# Patient Record
Sex: MALE | Race: BLACK OR AFRICAN AMERICAN | NOT HISPANIC OR LATINO | Employment: FULL TIME | URBAN - METROPOLITAN AREA
[De-identification: names, ages, dates, MRNs, and addresses within clinical notes are randomized per-mention and may not be internally consistent; named-entity substitution may affect disease eponyms.]

---

## 2019-06-25 LAB — HBA1C MFR BLD HPLC: 8.3 %

## 2019-09-17 ENCOUNTER — TRANSCRIBE ORDERS (OUTPATIENT)
Dept: NEPHROLOGY | Facility: CLINIC | Age: 59
End: 2019-09-17

## 2019-09-17 DIAGNOSIS — N18.30 BENIGN HYPERTENSION WITH CKD (CHRONIC KIDNEY DISEASE) STAGE III (HCC): Primary | ICD-10-CM

## 2019-09-17 DIAGNOSIS — I12.9 BENIGN HYPERTENSION WITH CKD (CHRONIC KIDNEY DISEASE) STAGE III (HCC): Primary | ICD-10-CM

## 2019-09-18 ENCOUNTER — CONSULT (OUTPATIENT)
Dept: NEPHROLOGY | Facility: CLINIC | Age: 59
End: 2019-09-18
Payer: COMMERCIAL

## 2019-09-18 ENCOUNTER — APPOINTMENT (OUTPATIENT)
Dept: LAB | Facility: HOSPITAL | Age: 59
End: 2019-09-18
Attending: INTERNAL MEDICINE
Payer: COMMERCIAL

## 2019-09-18 VITALS
SYSTOLIC BLOOD PRESSURE: 115 MMHG | HEART RATE: 55 BPM | RESPIRATION RATE: 16 BRPM | WEIGHT: 221.8 LBS | TEMPERATURE: 98 F | HEIGHT: 72 IN | BODY MASS INDEX: 30.04 KG/M2 | DIASTOLIC BLOOD PRESSURE: 80 MMHG

## 2019-09-18 DIAGNOSIS — E55.9 VITAMIN D DEFICIENCY: ICD-10-CM

## 2019-09-18 DIAGNOSIS — N18.3 TYPE 2 DIABETES MELLITUS WITH STAGE 3 CHRONIC KIDNEY DISEASE, UNSPECIFIED WHETHER LONG TERM INSULIN USE: ICD-10-CM

## 2019-09-18 DIAGNOSIS — N18.30 STAGE 3 CHRONIC KIDNEY DISEASE (HCC): Primary | ICD-10-CM

## 2019-09-18 DIAGNOSIS — N18.30 STAGE 3 CHRONIC KIDNEY DISEASE (HCC): ICD-10-CM

## 2019-09-18 DIAGNOSIS — E11.22 TYPE 2 DIABETES MELLITUS WITH STAGE 3 CHRONIC KIDNEY DISEASE, UNSPECIFIED WHETHER LONG TERM INSULIN USE: ICD-10-CM

## 2019-09-18 DIAGNOSIS — I12.9 HYPERTENSIVE KIDNEY DISEASE WITH STAGE 3 CHRONIC KIDNEY DISEASE (HCC): ICD-10-CM

## 2019-09-18 DIAGNOSIS — N18.30 HYPERTENSIVE KIDNEY DISEASE WITH STAGE 3 CHRONIC KIDNEY DISEASE (HCC): ICD-10-CM

## 2019-09-18 LAB
25(OH)D3 SERPL-MCNC: 15 NG/ML (ref 30–100)
ANION GAP SERPL CALCULATED.3IONS-SCNC: 9 MMOL/L (ref 4–13)
BACTERIA UR QL AUTO: ABNORMAL /HPF
BASOPHILS # BLD AUTO: 0.02 THOUSANDS/ΜL (ref 0–0.1)
BASOPHILS NFR BLD AUTO: 0 % (ref 0–1)
BILIRUB UR QL STRIP: NEGATIVE
BUN SERPL-MCNC: 39 MG/DL (ref 5–25)
CALCIUM SERPL-MCNC: 9.5 MG/DL (ref 8.3–10.1)
CHLORIDE SERPL-SCNC: 102 MMOL/L (ref 100–108)
CLARITY UR: CLEAR
CO2 SERPL-SCNC: 26 MMOL/L (ref 21–32)
COLOR UR: ABNORMAL
CREAT SERPL-MCNC: 1.9 MG/DL (ref 0.6–1.3)
CREAT UR-MCNC: 82.8 MG/DL
EOSINOPHIL # BLD AUTO: 0.01 THOUSAND/ΜL (ref 0–0.61)
EOSINOPHIL NFR BLD AUTO: 0 % (ref 0–6)
ERYTHROCYTE [DISTWIDTH] IN BLOOD BY AUTOMATED COUNT: 13.4 % (ref 11.6–15.1)
GFR SERPL CREATININE-BSD FRML MDRD: 44 ML/MIN/1.73SQ M
GLUCOSE SERPL-MCNC: 110 MG/DL (ref 65–140)
GLUCOSE UR STRIP-MCNC: NEGATIVE MG/DL
HCT VFR BLD AUTO: 46 % (ref 36.5–49.3)
HGB BLD-MCNC: 14.7 G/DL (ref 12–17)
HGB UR QL STRIP.AUTO: NEGATIVE
IMM GRANULOCYTES # BLD AUTO: 0.02 THOUSAND/UL (ref 0–0.2)
IMM GRANULOCYTES NFR BLD AUTO: 0 % (ref 0–2)
KETONES UR STRIP-MCNC: NEGATIVE MG/DL
LEUKOCYTE ESTERASE UR QL STRIP: NEGATIVE
LYMPHOCYTES # BLD AUTO: 1.76 THOUSANDS/ΜL (ref 0.6–4.47)
LYMPHOCYTES NFR BLD AUTO: 35 % (ref 14–44)
MCH RBC QN AUTO: 27.8 PG (ref 26.8–34.3)
MCHC RBC AUTO-ENTMCNC: 32 G/DL (ref 31.4–37.4)
MCV RBC AUTO: 87 FL (ref 82–98)
MICROALBUMIN UR-MCNC: 6.5 MG/L (ref 0–20)
MICROALBUMIN/CREAT 24H UR: 8 MG/G CREATININE (ref 0–30)
MONOCYTES # BLD AUTO: 0.56 THOUSAND/ΜL (ref 0.17–1.22)
MONOCYTES NFR BLD AUTO: 11 % (ref 4–12)
NEUTROPHILS # BLD AUTO: 2.66 THOUSANDS/ΜL (ref 1.85–7.62)
NEUTS SEG NFR BLD AUTO: 54 % (ref 43–75)
NITRITE UR QL STRIP: NEGATIVE
NON-SQ EPI CELLS URNS QL MICRO: ABNORMAL /HPF
NRBC BLD AUTO-RTO: 0 /100 WBCS
OTHER STN SPEC: ABNORMAL
PH UR STRIP.AUTO: 5 [PH]
PHOSPHATE SERPL-MCNC: 2.7 MG/DL (ref 2.7–4.5)
PLATELET # BLD AUTO: 180 THOUSANDS/UL (ref 149–390)
PMV BLD AUTO: 10.5 FL (ref 8.9–12.7)
POTASSIUM SERPL-SCNC: 4.3 MMOL/L (ref 3.5–5.3)
PROT UR STRIP-MCNC: NEGATIVE MG/DL
PTH-INTACT SERPL-MCNC: 76.5 PG/ML (ref 18.4–80.1)
RBC # BLD AUTO: 5.29 MILLION/UL (ref 3.88–5.62)
RBC #/AREA URNS AUTO: ABNORMAL /HPF
SODIUM SERPL-SCNC: 137 MMOL/L (ref 136–145)
SP GR UR STRIP.AUTO: 1.01 (ref 1–1.03)
URATE SERPL-MCNC: 5.9 MG/DL (ref 4.2–8)
UROBILINOGEN UR QL STRIP.AUTO: 0.2 E.U./DL
WBC # BLD AUTO: 5.03 THOUSAND/UL (ref 4.31–10.16)
WBC #/AREA URNS AUTO: ABNORMAL /HPF

## 2019-09-18 PROCEDURE — 82043 UR ALBUMIN QUANTITATIVE: CPT

## 2019-09-18 PROCEDURE — 82570 ASSAY OF URINE CREATININE: CPT

## 2019-09-18 PROCEDURE — 84550 ASSAY OF BLOOD/URIC ACID: CPT

## 2019-09-18 PROCEDURE — 99244 OFF/OP CNSLTJ NEW/EST MOD 40: CPT | Performed by: INTERNAL MEDICINE

## 2019-09-18 PROCEDURE — 83970 ASSAY OF PARATHORMONE: CPT

## 2019-09-18 PROCEDURE — 36415 COLL VENOUS BLD VENIPUNCTURE: CPT

## 2019-09-18 PROCEDURE — 84100 ASSAY OF PHOSPHORUS: CPT

## 2019-09-18 PROCEDURE — 80048 BASIC METABOLIC PNL TOTAL CA: CPT

## 2019-09-18 PROCEDURE — 81001 URINALYSIS AUTO W/SCOPE: CPT

## 2019-09-18 PROCEDURE — 82306 VITAMIN D 25 HYDROXY: CPT

## 2019-09-18 PROCEDURE — 85025 COMPLETE CBC W/AUTO DIFF WBC: CPT

## 2019-09-18 RX ORDER — SILDENAFIL 100 MG/1
TABLET, FILM COATED ORAL
COMMUNITY
Start: 2018-08-08

## 2019-09-18 RX ORDER — CHOLECALCIFEROL (VITAMIN D3) 50 MCG
TABLET ORAL
COMMUNITY
End: 2020-03-03 | Stop reason: ALTCHOICE

## 2019-09-18 RX ORDER — FLUTICASONE PROPIONATE 50 MCG
SPRAY, SUSPENSION (ML) NASAL
Refills: 11 | COMMUNITY
Start: 2019-07-09

## 2019-09-18 RX ORDER — PEN NEEDLE, DIABETIC 32GX 5/32"
NEEDLE, DISPOSABLE MISCELLANEOUS
Refills: 9 | COMMUNITY
Start: 2019-08-24

## 2019-09-18 RX ORDER — CARVEDILOL 6.25 MG/1
6.25 TABLET ORAL 2 TIMES DAILY WITH MEALS
Refills: 0 | COMMUNITY
Start: 2019-08-08

## 2019-09-18 RX ORDER — ATORVASTATIN CALCIUM 40 MG/1
TABLET, FILM COATED ORAL
Refills: 0 | COMMUNITY
Start: 2019-09-04

## 2019-09-18 RX ORDER — CHLORTHALIDONE 25 MG/1
TABLET ORAL
COMMUNITY
Start: 2019-09-11

## 2019-09-18 RX ORDER — LOSARTAN POTASSIUM 100 MG/1
TABLET ORAL
Refills: 0 | COMMUNITY
Start: 2019-06-25

## 2019-09-18 RX ORDER — SPIRONOLACTONE 50 MG/1
TABLET, FILM COATED ORAL
COMMUNITY
Start: 2019-07-23 | End: 2020-03-03 | Stop reason: ALTCHOICE

## 2019-09-18 RX ORDER — GLIMEPIRIDE 4 MG/1
4 TABLET ORAL
COMMUNITY
Start: 2019-06-11 | End: 2020-03-03 | Stop reason: ALTCHOICE

## 2019-09-18 RX ORDER — INSULIN GLARGINE 100 [IU]/ML
INJECTION, SOLUTION SUBCUTANEOUS
Refills: 11 | COMMUNITY
Start: 2019-08-23

## 2019-09-18 RX ORDER — SITAGLIPTIN 100 MG/1
TABLET, FILM COATED ORAL
Refills: 0 | COMMUNITY
Start: 2019-08-30 | End: 2020-03-03 | Stop reason: ALTCHOICE

## 2019-09-18 NOTE — LETTER
September 18, 2019     Alana Pa MD  [facility] [address] [facility]  [address]    Patient: Saulo Gallegos   YOB: 1960   Date of Visit: 9/18/2019       Dear Dr Lew Lord: Thank you for referring Saulo Gallegos to me for evaluation  Below are my notes for this consultation  If you have questions, please do not hesitate to call me  I look forward to following your patient along with you  Sincerely,        Ashlee Talavera MD        CC: No Recipients  Ashlee Talavera MD  9/18/2019 12:02 PM  Sign at close encounter  [address] 60 y o  @SEX @ YOB: 1960 MRN: [id number]    Encounter: [id number] DATE: 9/18/2019    REASON FOR VISIT: Saulo Gallegos is a 60 y o male who was referred by Janis Levin for further management of chronic kidney disease  HPI:    This is 77-year-old male with a past medical history of diabetes type 2, hypertension, thoracic aneurysm, hyperlipidemia, elevated PSA level was referred to Nephrology for further management of CKD stage 3  Patient was recently being seen by nephrologist at [facility] for underlying chronic kidney disease and decided to change physician  I have reviewed patient's old medical records including records from [facility]  Patient seems to have underlying CKD stage 3 and baseline creatinine level seems to be fluctuating between 1 7-2 1  Last known creatinine is 1 7 with EGFR of 43 which was done on 6/25/2019  I have also reviewed patient's renal ultrasound which was done in 7/2018 which showed increased echogenicity with echoic area and posterior wall of the bladder  Patient has underlying hypertension which seems under well control  Currently patient is taking spironolactone, chlorthalidone, Coreg and losartan  Patient also have underlying diabetes type 2 with last known Hb A1c of 8 3%  Recently metformin has been stopped by PCP    Patient is taking Januvia, glimepiride along with insulin now  Patient also have underlying hyperlipidemia which seems under well control with the use of atorvastatin  Patient takes all the medications as prescribed and denies use of NSAIDs  REVIEW OF SYSTEMS:    Review of Systems   Constitutional: Negative for chills and fever  HENT: Negative for nosebleeds and sore throat  Eyes: Negative for photophobia and pain  Respiratory: Negative for choking and wheezing  Cardiovascular: Negative for chest pain and palpitations  Gastrointestinal: Negative for abdominal pain and blood in stool  Endocrine: Negative for cold intolerance and heat intolerance  Genitourinary: Negative for flank pain and hematuria  Musculoskeletal: Negative for joint swelling and neck pain  Skin: Negative for color change and pallor  Allergic/Immunologic: Negative for environmental allergies and immunocompromised state  Neurological: Negative for seizures and syncope  Hematological: Negative for adenopathy  Does not bruise/bleed easily  Psychiatric/Behavioral: Negative for confusion and suicidal ideas           PAST MEDICAL HISTORY:  Past Medical History:   Diagnosis Date    Diabetes ([facility] [address] )     Kidney disease        PAST SURGICAL HISTORY:  Past Surgical History:   Procedure Laterality Date    HERNIA REPAIR         SOCIAL HISTORY:  Social History     Substance and Sexual Activity   Alcohol Use Not on file     Social History     Substance and Sexual Activity   Drug Use Not on file     Social History     Tobacco Use   Smoking Status Not on file       FAMILY HISTORY:  Family History   Problem Relation Age of Onset    Cancer Mother     Diabetes Mother     Diabetes Father     Hypertension Father        ALLERGY:  Allergies   Allergen Reactions    Tuberculin Purified Protein Derivative Other (See Comments)     Hx + ppd       MEDICATIONS:    Current Outpatient Medications:     aspirin 81 MG tablet, Take 81 mg by mouth, Disp: , Rfl:     carvedilol (COREG) 6 25 mg tablet, Take 6 25 mg by mouth 2 (two) times a day with meals, Disp: , Rfl: 0    chlorthalidone 25 mg tablet, take 1 tablet by mouth once daily, Disp: , Rfl:     glimepiride (AMARYL) 4 mg tablet, Take 4 mg by mouth, Disp: , Rfl:     glucose blood test strip, TEST twice a day TO 3 TIMES A DAY, Disp: , Rfl:     insulin glargine (LANTUS SOLOSTAR) 100 units/mL injection pen, Inject 10 Units under the skin, Disp: , Rfl:     losartan (COZAAR) 100 MG tablet, , Disp: , Rfl: 0    sildenafil (VIAGRA) 100 mg tablet, take 1 tablet by mouth 1 TO 4 HOURS BEFORE INTERCOURSE, NO MORE THAN 1 DOSE IN 24 HOURS, Disp: , Rfl:     spironolactone (ALDACTONE) 50 mg tablet, TAKE 1 TABLET BY MOUTH DAILY, Disp: , Rfl:     atorvastatin (LIPITOR) 40 mg tablet, , Disp: , Rfl: 0    BASAGLAR KWIKPEN 100 units/mL injection pen, PLEASE SEE ATTACHED FOR DETAILED DIRECTIONS, Disp: , Rfl: 11    BD PEN NEEDLE DESMOND U/F 32G X 4 MM MISC, Use as directed, Disp: , Rfl: 9    Cholecalciferol (VITAMIN D) 2000 units tablet, Take by mouth, Disp: , Rfl:     fluticasone (FLONASE) 50 mcg/act nasal spray, SPRAY 2 SPRAYS INTO EACH NOSTRIL EVERY DAY, Disp: , Rfl: 11    hydrocortisone (WESTCORT) 0 2 % cream, APPLY TOPICALLY TO AFFECTED AREA 2 TIMES A DAY, Disp: , Rfl: 0    JANUVIA 100 MG tablet, , Disp: , Rfl: 0    Multiple Vitamins-Minerals (MULTIVITAL) tablet, Take by mouth, Disp: , Rfl:     ONE TOUCH ULTRA TEST test strip, TEST TWO TO THREE TIMES A DAY, Disp: , Rfl: 0    PHYSICAL EXAM:  Vitals:    09/18/19 1130   BP: 115/80   BP Location: Right arm   Patient Position: Sitting   Cuff Size: Large   Pulse: 55   Resp: 16   Temp: 98 °F (36 7 °C)   TempSrc: Oral   Weight: 101 kg (221 lb 12 8 oz)   Height: 5' 11 5" (1 816 m)     Body mass index is 30 5 kg/m²  Physical Exam   Constitutional: He appears well-nourished  No distress  HENT:   Head: Normocephalic and atraumatic  Eyes: No scleral icterus  Neck: Neck supple  No JVD present  Cardiovascular: Normal rate, S1 normal and S2 normal    Pulmonary/Chest: Effort normal  No accessory muscle usage  No respiratory distress  He has no wheezes  Abdominal: Soft  He exhibits no distension  Musculoskeletal: He exhibits no edema  Right ankle: He exhibits no swelling  Left ankle: He exhibits no swelling  Right hand: He exhibits no laceration  Left hand: He exhibits no laceration  Lymphadenopathy:        Right cervical: No superficial cervical adenopathy present  Left cervical: No superficial cervical adenopathy present  Neurological: He is alert  He is not disoriented  Skin: Skin is warm  No cyanosis  Psychiatric: He is not combative  He does not exhibit a depressed mood  He expresses no suicidal ideation  LAB RESULTS:  No results found for this or any previous visit  Renal ultrasound done on 7/2018 showed increased echogenicity  Patient was also found to have aechoic area in posterior bladder wall  ASSESSMENT and PLAN:  Suresh Nesbitt was seen today for consult, hypertension and chronic kidney disease  Diagnoses and all orders for this visit:    Stage 3 chronic kidney disease ([facility] )  -     CBC and differential; Future  -     Basic metabolic panel; Future  -     Urinalysis with microscopic; Future  -     Microalbumin / creatinine urine ratio; Future  -     Phosphorus; Future  -     Uric acid; Future  -     PTH, intact; Future  -     Vitamin D 25 hydroxy; Future  -     US kidney and bladder with pvr; Future  -     CBC and differential; Future  -     Basic metabolic panel; Future  -     Urinalysis with reflex to microscopic; Future  -     Microalbumin / creatinine urine ratio; Future    Hypertensive kidney disease with stage 3 chronic kidney disease ([facility] )  -     Ambulatory referral to Nephrology  -     Basic metabolic panel;  Future    Type 2 diabetes mellitus with stage 3 chronic kidney disease, unspecified whether long term insulin use ([facility] )  - Basic metabolic panel; Future  -     Urinalysis with microscopic; Future  -     Microalbumin / creatinine urine ratio; Future      1  CKD stage 3  Multifactorial and suspected due to underlying diabetic nephropathy on top of hypertensive nephrosclerosis  Upon review of old medical records including records from [facility], patient's baseline creatinine seems to be fluctuating between 1 7-2 1 with last known creatinine of 1 7 with EGFR of 43 which was done on 6/25/2019  Patient was also previously been followed by nephrologist at [facility]  Plan to check CBC, BMP, uric acid, PTH, vitamin-D, urine analysis along with urine microalbumin to creatinine ratio for further evaluation  Patient's last known renal ultrasound which was done on 7/25/2018 showed increased echogenicity with focal a quick area in posterior right bladder wall  Will plan to recheck renal ultrasound today  Will consider referring patient to urologist for possible cystoscopy if found to have persistent echoic area in posterior bladder wall  Management of diabetes and hypertension as mentioned below  Plan to avoid NSAIDs and recheck renal function before next visit  2  Hypertension in chronic kidney disease  Today's blood pressure was under well control and plan to monitor hypertension with spironolactone 50 mg PO daily, Coreg 6 25 mg PO BID losartan 100 mg PO daily and chlorthalidone 25 mg PO daily  Advised patient to follow low-salt diet  3  Diabetes type 2 in chronic kidney disease  Recent Hb A1c was 8 3% for PCPas note  Goal Hb A1c would be < 7% in the light of underlying chronic kidney disease  Metformin has been recently stopped by PCP  Defer further management of diabetes to PCP  Continue to avoid metformin for time being  Returns to Nephrology office in 3 months  Plan to check CBC, BMP, urine analysis along with urine microalbumin to creatinine ratio before next visit      Patient can be reached at [phone]    Portions of the record may have been created with voice recognition software  Occasional wrong word or "sound a like" substitutions may have occurred due to the inherent limitations of voice recognition software  Read the chart carefully and recognize, using context, where substitutions have occurred  If you have any questions, please contact the dictating provider

## 2019-09-18 NOTE — PROGRESS NOTES
NEPHROLOGY OFFICE CONSULT  Gil Iniguez 60 y o  @SEX @ YOB: 1960 MRN: [id number]    Encounter: [id number] DATE: 9/18/2019    REASON FOR VISIT: Gil Iniguez is a 60 y o male who was referred by Gerry ePmberton for further management of chronic kidney disease  HPI:    This is 79-year-old male with a past medical history of diabetes type 2, hypertension, thoracic aneurysm, hyperlipidemia, elevated PSA level was referred to Nephrology for further management of CKD stage 3  Patient was recently being seen by nephrologist at [facility] for underlying chronic kidney disease and decided to change physician  I have reviewed patient's old medical records including records from [facility] everywhere  Patient seems to have underlying CKD stage 3 and baseline creatinine level seems to be fluctuating between 1 7-2 1  Last known creatinine is 1 7 with EGFR of 43 which was done on 6/25/2019  I have also reviewed patient's renal ultrasound which was done in 7/2018 which showed increased echogenicity with echoic area and posterior wall of the bladder  Patient has underlying hypertension which seems under well control  Currently patient is taking spironolactone, chlorthalidone, Coreg and losartan  Patient also have underlying diabetes type 2 with last known Hb A1c of 8 3%  Recently metformin has been stopped by PCP  Patient is taking Januvia, glimepiride along with insulin now  Patient also have underlying hyperlipidemia which seems under well control with the use of atorvastatin  Patient takes all the medications as prescribed and denies use of NSAIDs  REVIEW OF SYSTEMS:    Review of Systems   Constitutional: Negative for chills and fever  HENT: Negative for nosebleeds and sore throat  Eyes: Negative for photophobia and pain  Respiratory: Negative for choking and wheezing  Cardiovascular: Negative for chest pain and palpitations     Gastrointestinal: Negative for abdominal pain and blood in stool  Endocrine: Negative for cold intolerance and heat intolerance  Genitourinary: Negative for flank pain and hematuria  Musculoskeletal: Negative for joint swelling and neck pain  Skin: Negative for color change and pallor  Allergic/Immunologic: Negative for environmental allergies and immunocompromised state  Neurological: Negative for seizures and syncope  Hematological: Negative for adenopathy  Does not bruise/bleed easily  Psychiatric/Behavioral: Negative for confusion and suicidal ideas           PAST MEDICAL HISTORY:  Past Medical History:   Diagnosis Date    Diabetes ([address] )     Kidney disease        PAST SURGICAL HISTORY:  Past Surgical History:   Procedure Laterality Date    HERNIA REPAIR         SOCIAL HISTORY:  Social History     Substance and Sexual Activity   Alcohol Use Not on file     Social History     Substance and Sexual Activity   Drug Use Not on file     Social History     Tobacco Use   Smoking Status Not on file       FAMILY HISTORY:  Family History   Problem Relation Age of Onset    Cancer Mother     Diabetes Mother     Diabetes Father     Hypertension Father        ALLERGY:  Allergies   Allergen Reactions    Tuberculin Purified Protein Derivative Other (See Comments)     Hx + ppd       MEDICATIONS:    Current Outpatient Medications:     aspirin 81 MG tablet, Take 81 mg by mouth, Disp: , Rfl:     carvedilol (COREG) 6 25 mg tablet, Take 6 25 mg by mouth 2 (two) times a day with meals, Disp: , Rfl: 0    chlorthalidone 25 mg tablet, take 1 tablet by mouth once daily, Disp: , Rfl:     glimepiride (AMARYL) 4 mg tablet, Take 4 mg by mouth, Disp: , Rfl:     glucose blood test strip, TEST twice a day TO 3 TIMES A DAY, Disp: , Rfl:     insulin glargine (LANTUS SOLOSTAR) 100 units/mL injection pen, Inject 10 Units under the skin, Disp: , Rfl:     losartan (COZAAR) 100 MG tablet, , Disp: , Rfl: 0    sildenafil (VIAGRA) 100 mg tablet, take 1 tablet by mouth 1 TO 4 HOURS BEFORE INTERCOURSE, NO MORE THAN 1 DOSE IN 24 HOURS, Disp: , Rfl:     spironolactone (ALDACTONE) 50 mg tablet, TAKE 1 TABLET BY MOUTH DAILY, Disp: , Rfl:     atorvastatin (LIPITOR) 40 mg tablet, , Disp: , Rfl: 0    BASAGLAR KWIKPEN 100 units/mL injection pen, PLEASE SEE ATTACHED FOR DETAILED DIRECTIONS, Disp: , Rfl: 11    BD PEN NEEDLE DESMOND U/F 32G X 4 MM MISC, Use as directed, Disp: , Rfl: 9    Cholecalciferol (VITAMIN D) 2000 units tablet, Take by mouth, Disp: , Rfl:     fluticasone (FLONASE) 50 mcg/act nasal spray, SPRAY 2 SPRAYS INTO EACH NOSTRIL EVERY DAY, Disp: , Rfl: 11    hydrocortisone (WESTCORT) 0 2 % cream, APPLY TOPICALLY TO AFFECTED AREA 2 TIMES A DAY, Disp: , Rfl: 0    JANUVIA 100 MG tablet, , Disp: , Rfl: 0    Multiple Vitamins-Minerals (MULTIVITAL) tablet, Take by mouth, Disp: , Rfl:     ONE TOUCH ULTRA TEST test strip, TEST TWO TO THREE TIMES A DAY, Disp: , Rfl: 0    PHYSICAL EXAM:  Vitals:    09/18/19 1130   BP: 115/80   BP Location: Right arm   Patient Position: Sitting   Cuff Size: Large   Pulse: 55   Resp: 16   Temp: 98 °F (36 7 °C)   TempSrc: Oral   Weight: 101 kg (221 lb 12 8 oz)   Height: 5' 11 5" (1 816 m)     Body mass index is 30 5 kg/m²  Physical Exam   Constitutional: He appears well-nourished  No distress  HENT:   Head: Normocephalic and atraumatic  Eyes: No scleral icterus  Neck: Neck supple  No JVD present  Cardiovascular: Normal rate, S1 normal and S2 normal    Pulmonary/Chest: Effort normal  No accessory muscle usage  No respiratory distress  He has no wheezes  Abdominal: Soft  He exhibits no distension  Musculoskeletal: He exhibits no edema  Right ankle: He exhibits no swelling  Left ankle: He exhibits no swelling  Right hand: He exhibits no laceration  Left hand: He exhibits no laceration  Lymphadenopathy:        Right cervical: No superficial cervical adenopathy present         Left cervical: No superficial cervical adenopathy present  Neurological: He is alert  He is not disoriented  Skin: Skin is warm  No cyanosis  Psychiatric: He is not combative  He does not exhibit a depressed mood  He expresses no suicidal ideation  LAB RESULTS:  No results found for this or any previous visit  Renal ultrasound done on 7/2018 showed increased echogenicity  Patient was also found to have aechoic area in posterior bladder wall  ASSESSMENT and PLAN:  Jarrett Howard was seen today for consult, hypertension and chronic kidney disease  Diagnoses and all orders for this visit:    Stage 3 chronic kidney disease ([facility] )  -     CBC and differential; Future  -     Basic metabolic panel; Future  -     Urinalysis with microscopic; Future  -     Microalbumin / creatinine urine ratio; Future  -     Phosphorus; Future  -     Uric acid; Future  -     PTH, intact; Future  -     Vitamin D 25 hydroxy; Future  -     US kidney and bladder with pvr; Future  -     CBC and differential; Future  -     Basic metabolic panel; Future  -     Urinalysis with reflex to microscopic; Future  -     Microalbumin / creatinine urine ratio; Future    Hypertensive kidney disease with stage 3 chronic kidney disease ([facility] )  -     Ambulatory referral to Nephrology  -     Basic metabolic panel; Future    Type 2 diabetes mellitus with stage 3 chronic kidney disease, unspecified whether long term insulin use ([facility])  -     Basic metabolic panel; Future  -     Urinalysis with microscopic; Future  -     Microalbumin / creatinine urine ratio; Future      1  CKD stage 3  Multifactorial and suspected due to underlying diabetic nephropathy on top of hypertensive nephrosclerosis + NSAIDs induced nephropathy  Upon review of old medical records including records from [facility], patient's baseline creatinine seems to be fluctuating between 1 7-2 1 with last known creatinine of 1 7 with EGFR of 43 which was done on 6/25/2019      Patient was also previously been followed by nephrologist at [facility]  Plan to check CBC, BMP, uric acid, PTH, vitamin-D, urine analysis along with urine microalbumin to creatinine ratio for further evaluation  Patient's last known renal ultrasound which was done on 7/25/2018 showed increased echogenicity with focal a quick area in posterior right bladder wall  Will plan to recheck renal ultrasound today  Will consider referring patient to urologist for possible cystoscopy if found to have persistent echoic area in posterior bladder wall  Management of diabetes and hypertension as mentioned below  Plan to avoid NSAIDs and recheck renal function before next visit  2  Hypertension in chronic kidney disease  Today's blood pressure was under well control and plan to monitor hypertension with spironolactone 50 mg PO daily, Coreg 6 25 mg PO BID losartan 100 mg PO daily and chlorthalidone 25 mg PO daily  Advised patient to follow low-salt diet  3  Diabetes type 2 in chronic kidney disease  Recent Hb A1c was 8 3% for PCPas note  Goal Hb A1c would be < 7% in the light of underlying chronic kidney disease  Metformin has been recently stopped by PCP  Defer further management of diabetes to PCP  Continue to avoid metformin for time being  Returns to Nephrology office in 3 months  Plan to check CBC, BMP, urine analysis along with urine microalbumin to creatinine ratio before next visit  Patient can be reached at [phone]    Labs (reviewed on 9/19/2019)-called and left message to the patient  Stable creatinine at 1 9 with EGFR of 44  Hemoglobin 14 7  Urine analysis showed no dysuria  Urine microalbumin to creatinine ratio of 8 mg  Vitamin-D 15+ PTH 76-> start ergocalciferol 50,000 Units PO weekly X 12 weeks  Normal uric acid (5 9), sodium, potassium, bicarb, calcium and phosphorus  New prescription was sent to the pharmacy      Renal ultrasound (reviewed on 10/2/2019)-called and left message to the patient  No stone or hydronephrosis or urinary retention    NO CHANGE  Labs (done on 10/23/2019)  Hb A1c 9 6%  Labs (done on 12/24/2019)  Creatinine 1 8 with EGFR of 40  Hemoglobin 13 9  Labs (done on 2/29/2020)  Creatinine 2 0 with EGFR of 41  Hemoglobin 14 1  Urine analysis showed no dysuria  PSA 4 6-> patient is been followed by urologist-Dr Vinicio Tran     Normal sodium, potassium, bicarb and calcium    NO CHANGE    Portions of the record may have been created with voice recognition software  Occasional wrong word or "sound a like" substitutions may have occurred due to the inherent limitations of voice recognition software  Read the chart carefully and recognize, using context, where substitutions have occurred  If you have any questions, please contact the dictating provider

## 2019-09-19 RX ORDER — ERGOCALCIFEROL 1.25 MG/1
50000 CAPSULE ORAL WEEKLY
Qty: 12 CAPSULE | Refills: 0 | Status: SHIPPED | OUTPATIENT
Start: 2019-09-19 | End: 2020-03-03 | Stop reason: ALTCHOICE

## 2019-09-30 ENCOUNTER — HOSPITAL ENCOUNTER (OUTPATIENT)
Dept: ULTRASOUND IMAGING | Facility: HOSPITAL | Age: 59
Discharge: HOME | End: 2019-09-30
Attending: INTERNAL MEDICINE
Payer: COMMERCIAL

## 2019-09-30 DIAGNOSIS — N18.30 STAGE 3 CHRONIC KIDNEY DISEASE (HCC): ICD-10-CM

## 2019-09-30 PROCEDURE — 51798 US URINE CAPACITY MEASURE: CPT

## 2019-10-23 LAB — HBA1C MFR BLD HPLC: 9.6 %

## 2019-12-10 DIAGNOSIS — E55.9 VITAMIN D DEFICIENCY: ICD-10-CM

## 2019-12-10 RX ORDER — ERGOCALCIFEROL 1.25 MG/1
CAPSULE ORAL
Qty: 12 CAPSULE | Refills: 0 | OUTPATIENT
Start: 2019-12-10

## 2020-01-30 ENCOUNTER — TELEPHONE (OUTPATIENT)
Dept: NEPHROLOGY | Facility: CLINIC | Age: 60
End: 2020-01-30

## 2020-01-30 ENCOUNTER — DOCUMENTATION (OUTPATIENT)
Dept: NEPHROLOGY | Facility: CLINIC | Age: 60
End: 2020-01-30

## 2020-01-30 NOTE — TELEPHONE ENCOUNTER
I called and spoke to Dr Efren Mcleod patient about confirming his appointment for Monday 2/3/2020 3 month follow up with Dr Efren Mcleod and I remind the patient that he needed to have his blood work done for this appointment  The patient stated that he just had blood work at [facility] in [facility] last month  I explained to the patient that I would call [facility] to have those results faxed to us  I did call and I spoke to New read were faxed over  Patient wants to know if those labs are okay for his appointment for Monday  Lab results are faxed into the patients chart  Please call patient at [phone] if the patient needs to have more blood work done for his Monday appointment   Jacqueline Evangelista,

## 2020-01-30 NOTE — TELEPHONE ENCOUNTER
Can you please order CBC,bmp and urine analysis, and also let patient know to use [facility] to do before upcoming visit       Emory Hull

## 2020-01-31 DIAGNOSIS — N18.30 STAGE 3 CHRONIC KIDNEY DISEASE (HCC): Primary | ICD-10-CM

## 2020-01-31 NOTE — TELEPHONE ENCOUNTER
Patient did return our call and said that he did not understand why he needed to have more blood work done  I did explain to the patient that Dr Sigrid Cole wanted him to have more blood work done because his last blood work was done in 12/2019  The patient understood and said that he is going to have blood work done Saturday at [place] [id number]   Danay Boston,

## 2020-01-31 NOTE — PROGRESS NOTES
Spoke with the pt and let him know that the BMP,CBC, and urinalysis was order for him  Pt verbally understood that have to have the blood work done prior his visit

## 2020-02-04 ENCOUNTER — OFFICE VISIT (OUTPATIENT)
Dept: UROLOGY | Facility: CLINIC | Age: 60
End: 2020-02-04
Payer: COMMERCIAL

## 2020-02-04 VITALS
BODY MASS INDEX: 31.15 KG/M2 | HEIGHT: 72 IN | SYSTOLIC BLOOD PRESSURE: 134 MMHG | DIASTOLIC BLOOD PRESSURE: 82 MMHG | WEIGHT: 230 LBS | HEART RATE: 56 BPM

## 2020-02-04 DIAGNOSIS — E11.22 TYPE 2 DIABETES MELLITUS WITH STAGE 3 CHRONIC KIDNEY DISEASE, UNSPECIFIED WHETHER LONG TERM INSULIN USE: ICD-10-CM

## 2020-02-04 DIAGNOSIS — N18.30 HYPERTENSIVE KIDNEY DISEASE WITH STAGE 3 CHRONIC KIDNEY DISEASE (HCC): ICD-10-CM

## 2020-02-04 DIAGNOSIS — N40.0 BENIGN PROSTATIC HYPERPLASIA, UNSPECIFIED WHETHER LOWER URINARY TRACT SYMPTOMS PRESENT: Primary | ICD-10-CM

## 2020-02-04 DIAGNOSIS — I12.9 HYPERTENSIVE KIDNEY DISEASE WITH STAGE 3 CHRONIC KIDNEY DISEASE (HCC): ICD-10-CM

## 2020-02-04 DIAGNOSIS — N52.9 ED (ERECTILE DYSFUNCTION) OF ORGANIC ORIGIN: ICD-10-CM

## 2020-02-04 DIAGNOSIS — N18.3 TYPE 2 DIABETES MELLITUS WITH STAGE 3 CHRONIC KIDNEY DISEASE, UNSPECIFIED WHETHER LONG TERM INSULIN USE: ICD-10-CM

## 2020-02-04 LAB — POST-VOID RESIDUAL VOLUME, ML POC: 15 ML

## 2020-02-04 PROCEDURE — 51741 ELECTRO-UROFLOWMETRY FIRST: CPT | Performed by: UROLOGY

## 2020-02-04 PROCEDURE — 99244 OFF/OP CNSLTJ NEW/EST MOD 40: CPT | Performed by: UROLOGY

## 2020-02-04 PROCEDURE — 51798 US URINE CAPACITY MEASURE: CPT | Performed by: UROLOGY

## 2020-02-04 RX ORDER — TADALAFIL 20 MG/1
20 TABLET ORAL DAILY PRN
Qty: 10 TABLET | Refills: 6 | Status: SHIPPED | OUTPATIENT
Start: 2020-02-04

## 2020-02-04 RX ORDER — TAMSULOSIN HYDROCHLORIDE 0.4 MG/1
0.4 CAPSULE ORAL
COMMUNITY

## 2020-02-04 NOTE — PROGRESS NOTES
Uroflow Result    Indication:     medically refractory lower urinary tract symptoms       Procedure  [facility]  There asked to void volitionally into the uroflow apparatus  The following findings were noted:    Findings:  Voided Volume:    134  Maximum flow (Qmax):   21 ml/s  Description of emptying curve:    Normal bell-shaped       Post Void Residual Measurement:  After voiding to completion the patient was brought to the exam room and positioned supine    Residual urine volume was measured with an ultrasound at:    15 ml

## 2020-02-04 NOTE — PROGRESS NOTES
Referring Physician: Flores Santana MD  A copy of this note was sent to the referring physician  Diagnoses and all orders for this visit:    Benign prostatic hyperplasia, unspecified whether lower urinary tract symptoms present  -     POCT Measure PVR  -     PSA, Total Screen; Future    ED (erectile dysfunction) of organic origin  -     tadalafil (CIALIS) 20 MG tablet; Take 1 tablet (20 mg total) by mouth daily as needed for erectile dysfunction    Hypertensive kidney disease with stage 3 chronic kidney disease (HCC)    Type 2 diabetes mellitus with stage 3 chronic kidney disease, unspecified whether long term insulin use ([address] )    Other orders  -     tamsulosin (FLOMAX) 0 4 mg; Take 0 4 mg by mouth daily with dinner            Assessment and plan: 1  Frequency, urgency, double voiding    2  BPH    3  Intolerant of tamsulosin    4  Medically refractory erectile dysfunction    5  DM, CKD      Today, we discussed a stepwise approach in treating lower urinary tract symptoms associated with BPH  Lower urinary tract symptoms (LUTS) can be sub-divided into those that result from failure of the bladder to store urine normally ("storage symptoms"), those that result from difficulties in emptying ("voiding symptoms"), or those that follow micturition ("post-micturition symptoms")  Obstructive symptoms such as hesitancy, weak stream, and pushing to urinate are classified as voiding symptoms  OAB is due to the inability of the bladder to store urine normally  The symptoms of frequency, urgency, nocturia, and urge incontinence are classified as storage symptoms  I discussed the mainstays of therapy for voiding symptoms including alpha blockers, 5-alpha reductase inhibitors, and surgical management including TURP (laser, monopolar, bipolar, button electrode), TUIP, and prostatectomy  I had a candid discussion about the risks of each of these medications and the mechanism of action   I also discussed the use of PD5 inhibitors for LUTS  Today, we had a long and candid discussion about the multifactorial etiology of erectile dysfunction and the stepwise approach to treatment  I discussed the advantages and disadvantages of the standard treatments for erectile dysfunction including phosphodiesterase inhibitors, intracavernosal injections, urethral suppositories, vacuum erection devices, and penile implants  The patient is interested in trying oral pharmacotherapy to start  I discussed the potential side affects of these medications including, but not limited to flushing, headaches, visual changes, nasal congestion, interactions with other medications, prolonged erections, and failure to achieve an erection  He was warned never to take nitroglycerin in conjunction with medications for erectile dysfunction  I also recommended that he stagger when he takes alpha-blockers for BPH and phosphodiesterase inhibitors  If the patient fails multiple trials of the maximum strength dose, we will further discuss alternative treatment options  All of his questions were answered today and he understands the logic of this approach  PLAN:  -   Continue tamsulosin  -  Follow-up in near future for cystoscopy and transrectal ultrasound for further evaluation and consideration of surgical intervention  Patient was provided with educational materials about a number of options today  -  For his ED he has elected for a trial of sildenafil 20 mg  Dosing adverse effects reviewed  He will try to have this filled the [place]  -  He will follow up in the near future for cystoscopy and transrectal ultrasound and also a PSA prior to the visit        Jaxon Hassan MD      Chief Complaint      urinary troubles      History of Present Illness     Stephy Ramsey is a 60 y o  Male referred for a complex history of voiding dysfunction  Patient describes a longstanding history of trouble urinating    He also in the past has had pain following ejaculation and prior prostatitis  He underwent a cystoscopy in his 25s for microscopic hematuria  At the present time he is symptomatic primarily with storage symptoms, highly bothersome frequency and urgency both during the day and at night  He also has double voiding  Medical comorbidities include insulin-dependent diabetes, chronic kidney disease  He also has been on diuretics previously but none at the present time  He was appropriately initiated on tamsulosin by Dr Bevely Fothergill  Recently  He feels that this made a proximally 20% improvement to his lower urinary tract symptoms  Finally he wishes to discuss erectile dysfunction  He has difficulty achieving and sustaining an erection  He has been trialed on 100 mg of sildenafil which was not effective  He previously had better results with Cialis samples  He also notes pain following ejaculation  Detailed Urologic History     - please refer to HPI    Review of Systems     Review of Systems   Constitutional: Negative for activity change and fatigue  HENT: Negative for congestion  Eyes: Negative for visual disturbance  Respiratory: Negative for shortness of breath and wheezing  Cardiovascular: Negative for chest pain and leg swelling  Gastrointestinal: Negative for abdominal pain  Endocrine: Positive for polyuria  Genitourinary: Positive for dysuria, frequency and urgency  Negative for flank pain and hematuria  Musculoskeletal: Negative for back pain  Allergic/Immunologic: Negative for immunocompromised state  Neurological: Negative for dizziness and numbness  Psychiatric/Behavioral: Negative for dysphoric mood  All other systems reviewed and are negative  AUA SYMPTOM SCORE      Most Recent Value   AUA SYMPTOM SCORE   How often have you had a sensation of not emptying your bladder completely after you finished urinating?   3   How often have you had to urinate again less than two hours after you finished urinating? 3   How often have you found you stopped and started again several times when you urinate? 2   How often have you found it difficult to postpone urination? 4   How often have you had a weak urinary stream?  2   How often have you had to push or strain to begin urination? 2   How many times did you most typically get up to urinate from the time you went to bed at night until the time you got up in the morning? 3   Quality of Life: If you were to spend the rest of your life with your urinary condition just the way it is now, how would you feel about that?  4   AUA SYMPTOM SCORE  19            Allergies     Allergies   Allergen Reactions    Tuberculin Purified Protein Derivative Other (See Comments)     Hx + ppd       Physical Exam     Physical Exam   Constitutional: He is oriented to person, place, and time  He appears well-developed and well-nourished  No distress  HENT:   Head: Normocephalic and atraumatic  Eyes: EOM are normal    Neck: Normal range of motion  Cardiovascular:    Negative lower extremity edema   Pulmonary/Chest: Effort normal and breath sounds normal    Abdominal: Soft  Genitourinary:   Genitourinary Comments:  Negative suprapubic tenderness, CVA tenderness   Musculoskeletal: Normal range of motion  Neurological: He is alert and oriented to person, place, and time  Skin: Skin is warm  Psychiatric: He has a normal mood and affect   His behavior is normal            Vital Signs  Vitals:    02/04/20 0836   BP: 134/82   BP Location: Right arm   Patient Position: Sitting   Cuff Size: Standard   Pulse: 56   Weight: 104 kg (230 lb)   Height: 5' 11 5" (1 816 m)         Current Medications       Current Outpatient Medications:     atorvastatin (LIPITOR) 40 mg tablet, , Disp: , Rfl: 0    BASAGLAR KWIKPEN 100 units/mL injection pen, PLEASE SEE ATTACHED FOR DETAILED DIRECTIONS, Disp: , Rfl: 11    BD PEN NEEDLE DESMOND U/F 32G X 4 MM MISC, Use as directed, Disp: , Rfl: 9    carvedilol (COREG) 6 25 mg tablet, Take 6 25 mg by mouth 2 (two) times a day with meals, Disp: , Rfl: 0    chlorthalidone 25 mg tablet, take 1 tablet by mouth once daily, Disp: , Rfl:     Cholecalciferol (VITAMIN D) 2000 units tablet, Take by mouth, Disp: , Rfl:     fluticasone (FLONASE) 50 mcg/act nasal spray, SPRAY 2 SPRAYS INTO EACH NOSTRIL EVERY DAY, Disp: , Rfl: 11    glucose blood test strip, TEST twice a day TO 3 TIMES A DAY, Disp: , Rfl:     hydrocortisone (WESTCORT) 0 2 % cream, APPLY TOPICALLY TO AFFECTED AREA 2 TIMES A DAY, Disp: , Rfl: 0    insulin glargine (LANTUS SOLOSTAR) 100 units/mL injection pen, Inject 10 Units under the skin, Disp: , Rfl:     losartan (COZAAR) 100 MG tablet, , Disp: , Rfl: 0    ONE TOUCH ULTRA TEST test strip, TEST TWO TO THREE TIMES A DAY, Disp: , Rfl: 0    sildenafil (VIAGRA) 100 mg tablet, take 1 tablet by mouth 1 TO 4 HOURS BEFORE INTERCOURSE, NO MORE THAN 1 DOSE IN 24 HOURS, Disp: , Rfl:     tamsulosin (FLOMAX) 0 4 mg, Take 0 4 mg by mouth daily with dinner, Disp: , Rfl:     aspirin 81 MG tablet, Take 81 mg by mouth, Disp: , Rfl:     ergocalciferol (VITAMIN D2) 50,000 units, Take 1 capsule (50,000 Units total) by mouth once a week for 12 doses, Disp: 12 capsule, Rfl: 0    glimepiride (AMARYL) 4 mg tablet, Take 4 mg by mouth, Disp: , Rfl:     JANUVIA 100 MG tablet, , Disp: , Rfl: 0    Multiple Vitamins-Minerals (MULTIVITAL) tablet, Take by mouth, Disp: , Rfl:     spironolactone (ALDACTONE) 50 mg tablet, TAKE 1 TABLET BY MOUTH DAILY, Disp: , Rfl:     tadalafil (CIALIS) 20 MG tablet, Take 1 tablet (20 mg total) by mouth daily as needed for erectile dysfunction, Disp: 10 tablet, Rfl: 6      Active Problems     Patient Active Problem List   Diagnosis    Stage 3 chronic kidney disease ([facility] )    Hypertensive kidney disease with stage 3 chronic kidney disease ([address] )    DM type 2 [facility] CKD [facility]    ED (erectile dysfunction) of organic origin    Benign prostatic hyperplasia         Past Medical History     Past Medical History:   Diagnosis Date    Diabetes ([address] )     Hypertension     Kidney disease          Surgical History     Past Surgical History:   Procedure Laterality Date    HERNIA REPAIR      ROTATOR CUFF REPAIR  2011         Family History     Family History   Problem Relation Age of Onset   Mariithkiah Spruce Cancer Mother     Diabetes Mother     Diabetes Father     Hypertension Father     Hypertension Sister     Diabetes Sister     No Known Problems Sister          Social History     Social History     Social History     Tobacco Use   Smoking Status Never Smoker   Smokeless Tobacco Never Used         Pertinent Lab Values     Lab Results   Component Value Date    CREATININE 1 90 (H) 09/18/2019       No results found for: PSA    @RESULTRCNT(1H])@      Pertinent Imaging      - n/a    Portions of the record may have been created with voice recognition software   Occasional wrong word or "sound a like" substitutions may have occurred due to the inherent limitations of voice recognition software   Read the chart carefully and recognize, using context, where substitutions have occurred

## 2020-02-29 ENCOUNTER — APPOINTMENT (OUTPATIENT)
Dept: LAB | Facility: HOSPITAL | Age: 60
End: 2020-02-29
Attending: UROLOGY
Payer: COMMERCIAL

## 2020-02-29 DIAGNOSIS — N18.30 STAGE 3 CHRONIC KIDNEY DISEASE (HCC): ICD-10-CM

## 2020-02-29 DIAGNOSIS — N40.0 BENIGN PROSTATIC HYPERPLASIA, UNSPECIFIED WHETHER LOWER URINARY TRACT SYMPTOMS PRESENT: ICD-10-CM

## 2020-02-29 LAB
ANION GAP SERPL CALCULATED.3IONS-SCNC: 7 MMOL/L (ref 4–13)
BACTERIA UR QL AUTO: ABNORMAL /HPF
BASOPHILS # BLD AUTO: 0.01 THOUSANDS/ΜL (ref 0–0.1)
BASOPHILS NFR BLD AUTO: 0 % (ref 0–1)
BILIRUB UR QL STRIP: NEGATIVE
BUN SERPL-MCNC: 32 MG/DL (ref 5–25)
CALCIUM SERPL-MCNC: 9.4 MG/DL (ref 8.3–10.1)
CHLORIDE SERPL-SCNC: 103 MMOL/L (ref 100–108)
CLARITY UR: CLEAR
CO2 SERPL-SCNC: 32 MMOL/L (ref 21–32)
COLOR UR: YELLOW
CREAT SERPL-MCNC: 2 MG/DL (ref 0.6–1.3)
EOSINOPHIL # BLD AUTO: 0.02 THOUSAND/ΜL (ref 0–0.61)
EOSINOPHIL NFR BLD AUTO: 0 % (ref 0–6)
ERYTHROCYTE [DISTWIDTH] IN BLOOD BY AUTOMATED COUNT: 14.7 % (ref 11.6–15.1)
GFR SERPL CREATININE-BSD FRML MDRD: 41 ML/MIN/1.73SQ M
GLUCOSE SERPL-MCNC: 128 MG/DL (ref 65–140)
GLUCOSE UR STRIP-MCNC: NEGATIVE MG/DL
HCT VFR BLD AUTO: 44.6 % (ref 36.5–49.3)
HGB BLD-MCNC: 14.1 G/DL (ref 12–17)
HGB UR QL STRIP.AUTO: NEGATIVE
IMM GRANULOCYTES # BLD AUTO: 0.01 THOUSAND/UL (ref 0–0.2)
IMM GRANULOCYTES NFR BLD AUTO: 0 % (ref 0–2)
KETONES UR STRIP-MCNC: NEGATIVE MG/DL
LEUKOCYTE ESTERASE UR QL STRIP: NEGATIVE
LYMPHOCYTES # BLD AUTO: 1.52 THOUSANDS/ΜL (ref 0.6–4.47)
LYMPHOCYTES NFR BLD AUTO: 33 % (ref 14–44)
MCH RBC QN AUTO: 28 PG (ref 26.8–34.3)
MCHC RBC AUTO-ENTMCNC: 31.6 G/DL (ref 31.4–37.4)
MCV RBC AUTO: 89 FL (ref 82–98)
MONOCYTES # BLD AUTO: 0.47 THOUSAND/ΜL (ref 0.17–1.22)
MONOCYTES NFR BLD AUTO: 10 % (ref 4–12)
NEUTROPHILS # BLD AUTO: 2.62 THOUSANDS/ΜL (ref 1.85–7.62)
NEUTS SEG NFR BLD AUTO: 57 % (ref 43–75)
NITRITE UR QL STRIP: NEGATIVE
NON-SQ EPI CELLS URNS QL MICRO: ABNORMAL /HPF
NRBC BLD AUTO-RTO: 0 /100 WBCS
PH UR STRIP.AUTO: 5.5 [PH]
PLATELET # BLD AUTO: 190 THOUSANDS/UL (ref 149–390)
PMV BLD AUTO: 10.7 FL (ref 8.9–12.7)
POTASSIUM SERPL-SCNC: 3.5 MMOL/L (ref 3.5–5.3)
PROT UR STRIP-MCNC: NEGATIVE MG/DL
RBC # BLD AUTO: 5.03 MILLION/UL (ref 3.88–5.62)
RBC #/AREA URNS AUTO: ABNORMAL /HPF
SODIUM SERPL-SCNC: 142 MMOL/L (ref 136–145)
SP GR UR STRIP.AUTO: 1.01 (ref 1–1.03)
UROBILINOGEN UR QL STRIP.AUTO: 1 E.U./DL
WBC # BLD AUTO: 4.65 THOUSAND/UL (ref 4.31–10.16)
WBC #/AREA URNS AUTO: ABNORMAL /HPF

## 2020-02-29 PROCEDURE — G0103 PSA SCREENING: HCPCS

## 2020-02-29 PROCEDURE — 85025 COMPLETE CBC W/AUTO DIFF WBC: CPT

## 2020-02-29 PROCEDURE — 36415 COLL VENOUS BLD VENIPUNCTURE: CPT

## 2020-02-29 PROCEDURE — 81001 URINALYSIS AUTO W/SCOPE: CPT

## 2020-02-29 PROCEDURE — 80048 BASIC METABOLIC PNL TOTAL CA: CPT

## 2020-03-01 LAB — PSA SERPL-MCNC: 4.6 NG/ML (ref 0–4)

## 2020-03-02 ENCOUNTER — TELEPHONE (OUTPATIENT)
Dept: UROLOGY | Facility: CLINIC | Age: 60
End: 2020-03-02

## 2020-03-02 NOTE — TELEPHONE ENCOUNTER
Please call the patient and let him know that his PSA, prostate cancer screening test, is elevated at 4 6    One option would be to proceed with a prostate biopsy at the time of his upcoming cystoscopy and transrectal ultrasound  Please let him know this would be the conservative approach  Other option would be to follow his lab work over time that this may require him to need a 2nd procedure for biopsy down the line    Please let him know about the biopsy, if he is amenable he will need an antibiotic and enema prep provided

## 2020-03-03 ENCOUNTER — OFFICE VISIT (OUTPATIENT)
Dept: NEPHROLOGY | Facility: CLINIC | Age: 60
End: 2020-03-03
Payer: COMMERCIAL

## 2020-03-03 VITALS
BODY MASS INDEX: 30.88 KG/M2 | RESPIRATION RATE: 16 BRPM | SYSTOLIC BLOOD PRESSURE: 130 MMHG | DIASTOLIC BLOOD PRESSURE: 80 MMHG | HEART RATE: 61 BPM | WEIGHT: 233 LBS | TEMPERATURE: 97 F | HEIGHT: 73 IN

## 2020-03-03 DIAGNOSIS — I12.9 HYPERTENSIVE KIDNEY DISEASE WITH STAGE 3 CHRONIC KIDNEY DISEASE (HCC): ICD-10-CM

## 2020-03-03 DIAGNOSIS — N18.30 STAGE 3 CHRONIC KIDNEY DISEASE (HCC): Primary | ICD-10-CM

## 2020-03-03 DIAGNOSIS — N18.30 HYPERTENSIVE KIDNEY DISEASE WITH STAGE 3 CHRONIC KIDNEY DISEASE (HCC): ICD-10-CM

## 2020-03-03 DIAGNOSIS — E55.9 VITAMIN D DEFICIENCY: ICD-10-CM

## 2020-03-03 PROCEDURE — 99214 OFFICE O/P EST MOD 30 MIN: CPT | Performed by: INTERNAL MEDICINE

## 2020-03-03 NOTE — LETTER
March 3, 2020     Hazel Osei MD  [facility] [address] [facility]  [address]    Patient: Jethro Boxer   YOB: 1960   Date of Visit: 3/3/2020       Dear Dr Campbell Precise: Thank you for referring Jethro Boxer to me for evaluation  Below are my notes for this consultation  If you have questions, please do not hesitate to call me  I look forward to following your patient along with you  Sincerely,        Velia Horta MD        CC: No Recipients  Velia Horta MD  3/3/2020  9:45 AM  Sign at close encounter  [address] 60 y o  @SEX @ YOB: 1960 MRN: [id number]    Encounter: [id number] DATE: 3/3/2020    REASON FOR VISIT: Jethro Boxer is a 60 y o male returns to Nephrology office for further management of chronic kidney disease  HPI:    This is 50-year-old male with a past medical history of diabetes type 2, hypertension, thoracic aneurysm, hyperlipidemia, BPH, elevated PSA level, returns to Nephrology office for further management of chronic kidney disease stage 3  I have reviewed patient's old medical records including records from Care everywhere and baseline creatinine seems to be fluctuating around 1 7-2 1  Patient also have erectile dysfunction, BPH and elevated PSA level and also been followed by [address] [place] [address]'s urologist-Dr Cleo Lizama  Currently patient is taking Flomax along with Cialis  Patient is also scheduled to undergo prostate biopsy in near future  Patient has underlying hypertension which seems under well control  Currently patient is taking Coreg and losartan  Patient also have underlying diabetes type 2 and also been followed by endocrinologist   Patient is no longer taking Januvia and glimepiride  According to patient his A1c was around 7 4% which was checked by [facility] physician few weeks back    Patient is also been followed by endocrinologist       Patient also have underlying hyperlipidemia which seems under well control with the use of atorvastatin  Patient also have vitamin-D deficiency and was prescribed ergocalciferol earlier and now taking cholecalciferol  Patient takes all the medications as prescribed and denies use of NSAIDs  REVIEW OF SYSTEMS:    Review of Systems   Constitutional: Negative for chills and fever  HENT: Negative for nosebleeds and sore throat  Eyes: Negative for photophobia and pain  Respiratory: Negative for choking and wheezing  Cardiovascular: Negative for chest pain and palpitations  Gastrointestinal: Negative for abdominal pain and blood in stool  Endocrine: Negative for cold intolerance and heat intolerance  Genitourinary: Negative for flank pain and hematuria  Musculoskeletal: Negative for joint swelling and neck pain  Skin: Negative for color change and pallor  Allergic/Immunologic: Negative for environmental allergies and immunocompromised state  Neurological: Negative for seizures and syncope  Hematological: Negative for adenopathy  Does not bruise/bleed easily  Psychiatric/Behavioral: Negative for confusion and suicidal ideas           PAST MEDICAL HISTORY:  Past Medical History:   Diagnosis Date    Chronic kidney disease     Diabetes [facility] )     Hypertension     Kidney disease        PAST SURGICAL HISTORY:  Past Surgical History:   Procedure Laterality Date    HERNIA REPAIR      ROTATOR CUFF REPAIR  2011       SOCIAL HISTORY:  Social History     Substance and Sexual Activity   Alcohol Use Not on file     Social History     Substance and Sexual Activity   Drug Use Not on file     Social History     Tobacco Use   Smoking Status Never Smoker   Smokeless Tobacco Never Used       FAMILY HISTORY:  Family History   Problem Relation Age of Onset    Cancer Mother     Diabetes Mother     Diabetes Father     Hypertension Father     Hypertension Sister     Diabetes Sister     No Known Problems Sister        ALLERGY:  Allergies   Allergen Reactions    Tuberculin Purified Protein Derivative Other (See Comments)     Hx + ppd       MEDICATIONS:    Current Outpatient Medications:     aspirin 81 MG tablet, Take 81 mg by mouth, Disp: , Rfl:     atorvastatin (LIPITOR) 40 mg tablet, , Disp: , Rfl: 0    BASAGLAR KWIKPEN 100 units/mL injection pen, PLEASE SEE ATTACHED FOR DETAILED DIRECTIONS, Disp: , Rfl: 11    BD PEN NEEDLE DESMOND U/F 32G X 4 MM MISC, Use as directed, Disp: , Rfl: 9    carvedilol (COREG) 6 25 mg tablet, Take 6 25 mg by mouth 2 (two) times a day with meals, Disp: , Rfl: 0    chlorthalidone 25 mg tablet, take 1 tablet by mouth once daily, Disp: , Rfl:     fluticasone (FLONASE) 50 mcg/act nasal spray, SPRAY 2 SPRAYS INTO EACH NOSTRIL EVERY DAY, Disp: , Rfl: 11    glucose blood test strip, TEST twice a day TO 3 TIMES A DAY, Disp: , Rfl:     hydrocortisone (WESTCORT) 0 2 % cream, APPLY TOPICALLY TO AFFECTED AREA 2 TIMES A DAY, Disp: , Rfl: 0    insulin glargine (LANTUS SOLOSTAR) 100 units/mL injection pen, Inject 10 Units under the skin, Disp: , Rfl:     losartan (COZAAR) 100 MG tablet, , Disp: , Rfl: 0    ONE TOUCH ULTRA TEST test strip, TEST TWO TO THREE TIMES A DAY, Disp: , Rfl: 0    sildenafil (VIAGRA) 100 mg tablet, take 1 tablet by mouth 1 TO 4 HOURS BEFORE INTERCOURSE, NO MORE THAN 1 DOSE IN 24 HOURS, Disp: , Rfl:     tadalafil (CIALIS) 20 MG tablet, Take 1 tablet (20 mg total) by mouth daily as needed for erectile dysfunction, Disp: 10 tablet, Rfl: 6    tamsulosin (FLOMAX) 0 4 mg, Take 0 4 mg by mouth daily with dinner, Disp: , Rfl:     Cholecalciferol 50 MCG (2000 UT) TABS, Take 1 tablet (2,000 Units total) by mouth daily, Disp: , Rfl:     PHYSICAL EXAM:  Vitals:    03/03/20 0917   BP: 130/80   BP Location: Left arm   Patient Position: Sitting   Cuff Size: Standard   Pulse: 61   Resp: 16   Temp: (!) 97 °F (36 1 °C)   TempSrc: Tympanic   Weight: 106 kg (233 lb)   Height: 6' 0 5" (1 842 m)     Body mass index is 31 17 kg/m²  Physical Exam   Constitutional: He appears well-nourished  No distress  HENT:   Head: Normocephalic and atraumatic  Eyes: No scleral icterus  Neck: Neck supple  No JVD present  Cardiovascular: Normal rate, S1 normal and S2 normal    Pulmonary/Chest: Effort normal  No accessory muscle usage  No respiratory distress  He has no wheezes  Abdominal: Soft  He exhibits no distension  Musculoskeletal: He exhibits no edema  Right ankle: He exhibits no swelling  Left ankle: He exhibits no swelling  Right hand: He exhibits no laceration  Left hand: He exhibits no laceration  Lymphadenopathy:        Right cervical: No superficial cervical adenopathy present  Left cervical: No superficial cervical adenopathy present  Neurological: He is alert  He is not disoriented  Skin: Skin is warm  No cyanosis  Psychiatric: He is not combative  He does not exhibit a depressed mood  He expresses no suicidal ideation         LAB RESULTS:  Results for orders placed or performed in visit on 02/29/20   PSA, Total Screen   Result Value Ref Range    PSA 4 6 (H) 0 0 - 4 0 ng/mL   CBC and differential   Result Value Ref Range    WBC 4 65 4 31 - 10 16 Thousand/uL    RBC 5 03 3 88 - 5 62 Million/uL    Hemoglobin 14 1 12 0 - 17 0 g/dL    Hematocrit 44 6 36 5 - 49 3 %    MCV 89 82 - 98 fL    MCH 28 0 26 8 - 34 3 pg    MCHC 31 6 31 4 - 37 4 g/dL    RDW 14 7 11 6 - 15 1 %    MPV 10 7 8 9 - 12 7 fL    Platelets 864 319 - 186 Thousands/uL    nRBC 0 /100 WBCs    Neutrophils Relative 57 43 - 75 %    Immat GRANS % 0 0 - 2 %    Lymphocytes Relative 33 14 - 44 %    Monocytes Relative 10 4 - 12 %    Eosinophils Relative 0 0 - 6 %    Basophils Relative 0 0 - 1 %    Neutrophils Absolute 2 62 1 85 - 7 62 Thousands/µL    Immature Grans Absolute 0 01 0 00 - 0 20 Thousand/uL    Lymphocytes Absolute 1 52 0 60 - 4 47 Thousands/µL    Monocytes Absolute 0 47 0 17 - 1 22 Thousand/µL    Eosinophils Absolute 0 02 0 00 - 0 61 Thousand/µL    Basophils Absolute 0 01 0 00 - 0 10 Thousands/µL   Basic metabolic panel   Result Value Ref Range    Sodium 142 136 - 145 mmol/L    Potassium 3 5 3 5 - 5 3 mmol/L    Chloride 103 100 - 108 mmol/L    CO2 32 21 - 32 mmol/L    ANION GAP 7 4 - 13 mmol/L    BUN 32 (H) 5 - 25 mg/dL    Creatinine 2 00 (H) 0 60 - 1 30 mg/dL    Glucose 128 65 - 140 mg/dL    Calcium 9 4 8 3 - 10 1 mg/dL    eGFR 41 ml/min/1 73sq m        Renal ultrasound done on 9/30/2019 showed no hydronephrosis  ASSESSMENT and PLAN:  Matt Anaya was seen today for chronic kidney disease and follow-up  Diagnoses and all orders for this visit:    Stage 3 chronic kidney disease ([address] )  -     CBC and differential; Future  -     Basic metabolic panel; Future  -     Urinalysis with microscopic; Future  -     Microalbumin / creatinine urine ratio; Future  -     Phosphorus; Future  -     Uric acid; Future  -     PTH, intact; Future  -     Vitamin D 25 hydroxy; Future    Hypertensive kidney disease with stage 3 chronic kidney disease (HCC)  -     Basic metabolic panel; Future  -     Urinalysis with microscopic; Future  -     Microalbumin / creatinine urine ratio; Future    Vitamin D deficiency  -     Vitamin D 25 hydroxy; Future  -     Cholecalciferol 50 MCG (2000 UT) TABS; Take 1 tablet (2,000 Units total) by mouth daily      1  CKD stage 3  Multifactorial and suspected due to underlying diabetic nephropathy on top of hypertensive nephrosclerosis + NSAIDs induced nephropathy  Upon review of old medical records including records from [facility] everywhere, patient's baseline creatinine seems to be fluctuating between 1 7-2 1 and in the interim renal function has remained relatively stable with current creatinine of 2 0 with EGFR of 41  Renal ultrasound done on 9/30/2019 showed no stone or hydronephrosis   Patient has underlying BPH, erectile dysfunction and elevated PSA level and now been followed by urologist at [facility] and scheduled to undergo prostate biopsy in near future  Patient currently denies any urinary complaint and currently also taking Cialis and Flomax       Management of diabetes and hypertension as mentioned below  Plan to avoid NSAIDs and recheck renal function before next visit  2  Hypertension in chronic kidney disease  Today's blood pressure was under well control and plan to monitor hypertension with Coreg 6 25 mg PO BID, losartan 100 mg PO daily and chlorthalidone 25 mg PO daily  Previously patient was taking spironolactone which has been stopped in the interim for unknown reason  Also advised patient to follow low-salt diet  3  Diabetes type 2 in chronic kidney disease  Goal Hb A1c would be < 7% for underlying chronic kidney disease  Metformin was stopped in the past and in the interim patient is no longer taking Januvia and glimepiride  Currently patient is taking insulin and defer further management diabetes to PCP/endocrinologist     4  Vitamin-D deficiency  Patient was found to have low vitamin-D level of 15 with PTH of 76 and was given ergocalciferol 50,000 Units PO weekly X 12 weeks     Now patient is taking cholecalciferol and advised patient to increase the dose of cholecalciferol 2000 Units PO daily  Currently calcium level is at goal   Recheck vitamin-D level with the next visit  Returns to Nephrology office in 6 months  Future labs ordered  Portions of the record may have been created with voice recognition software  Occasional wrong word or "sound a like" substitutions may have occurred due to the inherent limitations of voice recognition software  Read the chart carefully and recognize, using context, where substitutions have occurred  If you have any questions, please contact the dictating provider

## 2020-03-03 NOTE — PROGRESS NOTES
NEPHROLOGY OFFICE FOLLOW-UP  Chitra Alvarez 60 y o  @SEX @ YOB: 1960 MRN: [id number]    Encounter: [id number] DATE: 3/3/2020    REASON FOR VISIT: Chitra Alvarez is a 60 y o male returns to Nephrology office for further management of chronic kidney disease  HPI:    This is 56-year-old male with a past medical history of diabetes type 2, hypertension, thoracic aneurysm, hyperlipidemia, BPH, elevated PSA level, returns to Nephrology office for further management of chronic kidney disease stage 3  I have reviewed patient's old medical records including records from Care everywhere and baseline creatinine seems to be fluctuating around 1 7-2 1  Patient also have erectile dysfunction, BPH and elevated PSA level and also been followed by [facility]rologist-Dr Gino Mandel  Currently patient is taking Flomax along with Cialis  Patient is also scheduled to undergo prostate biopsy in near future  Patient has underlying hypertension which seems under well control  Currently patient is taking Coreg and losartan  Patient also have underlying diabetes type 2 and also been followed by endocrinologist   Patient is no longer taking Januvia and glimepiride  According to patient his A1c was around 7 4% which was checked by [facility] physician few weeks back  Patient is also been followed by endocrinologist       Patient also have underlying hyperlipidemia which seems under well control with the use of atorvastatin  Patient also have vitamin-D deficiency and was prescribed ergocalciferol earlier and now taking cholecalciferol  Patient takes all the medications as prescribed and denies use of NSAIDs  REVIEW OF SYSTEMS:    Review of Systems   Constitutional: Negative for chills and fever  HENT: Negative for nosebleeds and sore throat  Eyes: Negative for photophobia and pain  Respiratory: Negative for choking and wheezing  Cardiovascular: Negative for chest pain and palpitations  Gastrointestinal: Negative for abdominal pain and blood in stool  Endocrine: Negative for cold intolerance and heat intolerance  Genitourinary: Negative for flank pain and hematuria  Musculoskeletal: Negative for joint swelling and neck pain  Skin: Negative for color change and pallor  Allergic/Immunologic: Negative for environmental allergies and immunocompromised state  Neurological: Negative for seizures and syncope  Hematological: Negative for adenopathy  Does not bruise/bleed easily  Psychiatric/Behavioral: Negative for confusion and suicidal ideas           PAST MEDICAL HISTORY:  Past Medical History:   Diagnosis Date    Chronic kidney disease     Diabetes ([address] )     Hypertension     Kidney disease        PAST SURGICAL HISTORY:  Past Surgical History:   Procedure Laterality Date    HERNIA REPAIR      ROTATOR CUFF REPAIR  2011       SOCIAL HISTORY:  Social History     Substance and Sexual Activity   Alcohol Use Not on file     Social History     Substance and Sexual Activity   Drug Use Not on file     Social History     Tobacco Use   Smoking Status Never Smoker   Smokeless Tobacco Never Used       FAMILY HISTORY:  Family History   Problem Relation Age of Onset    Cancer Mother     Diabetes Mother     Diabetes Father     Hypertension Father     Hypertension Sister     Diabetes Sister     No Known Problems Sister        ALLERGY:  Allergies   Allergen Reactions    Tuberculin Purified Protein Derivative Other (See Comments)     Hx + ppd       MEDICATIONS:    Current Outpatient Medications:     aspirin 81 MG tablet, Take 81 mg by mouth, Disp: , Rfl:     atorvastatin (LIPITOR) 40 mg tablet, , Disp: , Rfl: 0    BASAGLAR KWIKPEN 100 units/mL injection pen, PLEASE SEE ATTACHED FOR DETAILED DIRECTIONS, Disp: , Rfl: 11    BD PEN NEEDLE DESMOND U/F 32G X 4 MM MISC, Use as directed, Disp: , Rfl: 9    carvedilol (COREG) 6 25 mg tablet, Take 6 25 mg by mouth 2 (two) times a day with meals, Disp: , Rfl: 0    chlorthalidone 25 mg tablet, take 1 tablet by mouth once daily, Disp: , Rfl:     fluticasone (FLONASE) 50 mcg/act nasal spray, SPRAY 2 SPRAYS INTO EACH NOSTRIL EVERY DAY, Disp: , Rfl: 11    glucose blood test strip, TEST twice a day TO 3 TIMES A DAY, Disp: , Rfl:     hydrocortisone (WESTCORT) 0 2 % cream, APPLY TOPICALLY TO AFFECTED AREA 2 TIMES A DAY, Disp: , Rfl: 0    insulin glargine (LANTUS SOLOSTAR) 100 units/mL injection pen, Inject 10 Units under the skin, Disp: , Rfl:     losartan (COZAAR) 100 MG tablet, , Disp: , Rfl: 0    ONE TOUCH ULTRA TEST test strip, TEST TWO TO THREE TIMES A DAY, Disp: , Rfl: 0    sildenafil (VIAGRA) 100 mg tablet, take 1 tablet by mouth 1 TO 4 HOURS BEFORE INTERCOURSE, NO MORE THAN 1 DOSE IN 24 HOURS, Disp: , Rfl:     tadalafil (CIALIS) 20 MG tablet, Take 1 tablet (20 mg total) by mouth daily as needed for erectile dysfunction, Disp: 10 tablet, Rfl: 6    tamsulosin (FLOMAX) 0 4 mg, Take 0 4 mg by mouth daily with dinner, Disp: , Rfl:     Cholecalciferol 50 MCG (2000 UT) TABS, Take 1 tablet (2,000 Units total) by mouth daily, Disp: , Rfl:     PHYSICAL EXAM:  Vitals:    03/03/20 0917   BP: 130/80   BP Location: Left arm   Patient Position: Sitting   Cuff Size: Standard   Pulse: 61   Resp: 16   Temp: (!) 97 °F (36 1 °C)   TempSrc: Tympanic   Weight: 106 kg (233 lb)   Height: 6' 0 5" (1 842 m)     Body mass index is 31 17 kg/m²  Physical Exam   Constitutional: He appears well-nourished  No distress  HENT:   Head: Normocephalic and atraumatic  Eyes: No scleral icterus  Neck: Neck supple  No JVD present  Cardiovascular: Normal rate, S1 normal and S2 normal    Pulmonary/Chest: Effort normal  No accessory muscle usage  No respiratory distress  He has no wheezes  Abdominal: Soft  He exhibits no distension  Musculoskeletal: He exhibits no edema  Right ankle: He exhibits no swelling  Left ankle: He exhibits no swelling          Right hand: He exhibits no laceration  Left hand: He exhibits no laceration  Lymphadenopathy:        Right cervical: No superficial cervical adenopathy present  Left cervical: No superficial cervical adenopathy present  Neurological: He is alert  He is not disoriented  Skin: Skin is warm  No cyanosis  Psychiatric: He is not combative  He does not exhibit a depressed mood  He expresses no suicidal ideation  LAB RESULTS:  Results for orders placed or performed in visit on 02/29/20   PSA, Total Screen   Result Value Ref Range    PSA 4 6 (H) 0 0 - 4 0 ng/mL   CBC and differential   Result Value Ref Range    WBC 4 65 4 31 - 10 16 Thousand/uL    RBC 5 03 3 88 - 5 62 Million/uL    Hemoglobin 14 1 12 0 - 17 0 g/dL    Hematocrit 44 6 36 5 - 49 3 %    MCV 89 82 - 98 fL    MCH 28 0 26 8 - 34 3 pg    MCHC 31 6 31 4 - 37 4 g/dL    RDW 14 7 11 6 - 15 1 %    MPV 10 7 8 9 - 12 7 fL    Platelets 750 567 - 999 Thousands/uL    nRBC 0 /100 WBCs    Neutrophils Relative 57 43 - 75 %    Immat GRANS % 0 0 - 2 %    Lymphocytes Relative 33 14 - 44 %    Monocytes Relative 10 4 - 12 %    Eosinophils Relative 0 0 - 6 %    Basophils Relative 0 0 - 1 %    Neutrophils Absolute 2 62 1 85 - 7 62 Thousands/µL    Immature Grans Absolute 0 01 0 00 - 0 20 Thousand/uL    Lymphocytes Absolute 1 52 0 60 - 4 47 Thousands/µL    Monocytes Absolute 0 47 0 17 - 1 22 Thousand/µL    Eosinophils Absolute 0 02 0 00 - 0 61 Thousand/µL    Basophils Absolute 0 01 0 00 - 0 10 Thousands/µL   Basic metabolic panel   Result Value Ref Range    Sodium 142 136 - 145 mmol/L    Potassium 3 5 3 5 - 5 3 mmol/L    Chloride 103 100 - 108 mmol/L    CO2 32 21 - 32 mmol/L    ANION GAP 7 4 - 13 mmol/L    BUN 32 (H) 5 - 25 mg/dL    Creatinine 2 00 (H) 0 60 - 1 30 mg/dL    Glucose 128 65 - 140 mg/dL    Calcium 9 4 8 3 - 10 1 mg/dL    eGFR 41 ml/min/1 73sq m        Renal ultrasound done on 9/30/2019 showed no hydronephrosis      ASSESSMENT and PLAN:  Venkatesh Araujo was seen today for chronic kidney disease and follow-up  Diagnoses and all orders for this visit:    Stage 3 chronic kidney disease ([facility] [address] )  -     CBC and differential; Future  -     Basic metabolic panel; Future  -     Urinalysis with microscopic; Future  -     Microalbumin / creatinine urine ratio; Future  -     Phosphorus; Future  -     Uric acid; Future  -     PTH, intact; Future  -     Vitamin D 25 hydroxy; Future    Hypertensive kidney disease with stage 3 chronic kidney disease (HCC)  -     Basic metabolic panel; Future  -     Urinalysis with microscopic; Future  -     Microalbumin / creatinine urine ratio; Future    Vitamin D deficiency  -     Vitamin D 25 hydroxy; Future  -     Cholecalciferol 50 MCG (2000 UT) TABS; Take 1 tablet (2,000 Units total) by mouth daily      1  CKD stage 3  Multifactorial and suspected due to underlying diabetic nephropathy on top of hypertensive nephrosclerosis + NSAIDs induced nephropathy  Upon review of old medical records including records from [facility], patient's baseline creatinine seems to be fluctuating between 1 7-2 1 and in the interim renal function has remained relatively stable with current creatinine of 2 0 with EGFR of 41  Renal ultrasound done on 9/30/2019 showed no stone or hydronephrosis  Patient has underlying BPH, erectile dysfunction and elevated PSA level and now been followed by urologist at [place] [id number] and scheduled to undergo prostate biopsy in near future  Patient currently denies any urinary complaint and currently also taking Cialis and Flomax       Management of diabetes and hypertension as mentioned below  Plan to avoid NSAIDs and recheck renal function before next visit  2  Hypertension in chronic kidney disease  Today's blood pressure was under well control and plan to monitor hypertension with Coreg 6 25 mg PO BID, losartan 100 mg PO daily and chlorthalidone 25 mg PO daily    Previously patient was taking spironolactone which has been stopped in the interim for unknown reason  Also advised patient to follow low-salt diet  3  Diabetes type 2 in chronic kidney disease  Goal Hb A1c would be < 7% for underlying chronic kidney disease  Metformin was stopped in the past and in the interim patient is no longer taking Januvia and glimepiride  Currently patient is taking insulin and defer further management diabetes to PCP/endocrinologist     4  Vitamin-D deficiency  Patient was found to have low vitamin-D level of 15 with PTH of 76 and was given ergocalciferol 50,000 Units PO weekly X 12 weeks     Now patient is taking cholecalciferol and advised patient to increase the dose of cholecalciferol 2000 Units PO daily  Currently calcium level is at goal   Recheck vitamin-D level with the next visit  Returns to Nephrology office in 6 months  Future labs ordered  Labs (reviewed on 10/5/2020)  Stable creatinine at 1 84 with EGFR of 45  Hemoglobin 15 0  Urine analysis showed no dysuria  Urine microalbumin to creatinine ratio < 22 mg  Vitamin-D 32-> continue cholecalciferol  Normal PTH (63), uric acid (6 1), sodium, potassium, bicarb, calcium and phosphorus    NO CHANGE    Portions of the record may have been created with voice recognition software  Occasional wrong word or "sound a like" substitutions may have occurred due to the inherent limitations of voice recognition software  Read the chart carefully and recognize, using context, where substitutions have occurred  If you have any questions, please contact the dictating provider

## 2020-04-10 ENCOUNTER — TELEPHONE (OUTPATIENT)
Dept: UROLOGY | Facility: CLINIC | Age: 60
End: 2020-04-10

## 2020-06-17 ENCOUNTER — TELEPHONE (OUTPATIENT)
Dept: NEPHROLOGY | Facility: CLINIC | Age: 60
End: 2020-06-17

## 2020-10-01 ENCOUNTER — TELEPHONE (OUTPATIENT)
Dept: NEPHROLOGY | Facility: CLINIC | Age: 60
End: 2020-10-01

## 2020-10-02 ENCOUNTER — TELEPHONE (OUTPATIENT)
Dept: NEPHROLOGY | Facility: CLINIC | Age: 60
End: 2020-10-02

## 2020-10-03 ENCOUNTER — APPOINTMENT (OUTPATIENT)
Dept: LAB | Facility: HOSPITAL | Age: 60
End: 2020-10-03
Attending: INTERNAL MEDICINE
Payer: COMMERCIAL

## 2020-10-03 DIAGNOSIS — N18.30 HYPERTENSIVE KIDNEY DISEASE WITH STAGE 3 CHRONIC KIDNEY DISEASE (HCC): ICD-10-CM

## 2020-10-03 DIAGNOSIS — N18.30 STAGE 3 CHRONIC KIDNEY DISEASE (HCC): ICD-10-CM

## 2020-10-03 DIAGNOSIS — I12.9 HYPERTENSIVE KIDNEY DISEASE WITH STAGE 3 CHRONIC KIDNEY DISEASE (HCC): ICD-10-CM

## 2020-10-03 DIAGNOSIS — E55.9 VITAMIN D DEFICIENCY: ICD-10-CM

## 2020-10-03 LAB
25(OH)D3 SERPL-MCNC: 32.8 NG/ML (ref 30–100)
ANION GAP SERPL CALCULATED.3IONS-SCNC: 10 MMOL/L (ref 4–13)
BACTERIA UR QL AUTO: NORMAL /HPF
BASOPHILS # BLD AUTO: 0.01 THOUSANDS/ΜL (ref 0–0.1)
BASOPHILS NFR BLD AUTO: 0 % (ref 0–1)
BILIRUB UR QL STRIP: NEGATIVE
BUN SERPL-MCNC: 21 MG/DL (ref 5–25)
CALCIUM SERPL-MCNC: 9.5 MG/DL (ref 8.3–10.1)
CHLORIDE SERPL-SCNC: 102 MMOL/L (ref 100–108)
CLARITY UR: CLEAR
CO2 SERPL-SCNC: 28 MMOL/L (ref 21–32)
COLOR UR: YELLOW
CREAT SERPL-MCNC: 1.84 MG/DL (ref 0.6–1.3)
CREAT UR-MCNC: 22.8 MG/DL
EOSINOPHIL # BLD AUTO: 0.01 THOUSAND/ΜL (ref 0–0.61)
EOSINOPHIL NFR BLD AUTO: 0 % (ref 0–6)
ERYTHROCYTE [DISTWIDTH] IN BLOOD BY AUTOMATED COUNT: 15.1 % (ref 11.6–15.1)
GFR SERPL CREATININE-BSD FRML MDRD: 45 ML/MIN/1.73SQ M
GLUCOSE P FAST SERPL-MCNC: 169 MG/DL (ref 65–99)
GLUCOSE UR STRIP-MCNC: NEGATIVE MG/DL
HCT VFR BLD AUTO: 46.9 % (ref 36.5–49.3)
HGB BLD-MCNC: 15 G/DL (ref 12–17)
HGB UR QL STRIP.AUTO: NEGATIVE
IMM GRANULOCYTES # BLD AUTO: 0.01 THOUSAND/UL (ref 0–0.2)
IMM GRANULOCYTES NFR BLD AUTO: 0 % (ref 0–2)
KETONES UR STRIP-MCNC: NEGATIVE MG/DL
LEUKOCYTE ESTERASE UR QL STRIP: NEGATIVE
LYMPHOCYTES # BLD AUTO: 1.64 THOUSANDS/ΜL (ref 0.6–4.47)
LYMPHOCYTES NFR BLD AUTO: 38 % (ref 14–44)
MCH RBC QN AUTO: 27.6 PG (ref 26.8–34.3)
MCHC RBC AUTO-ENTMCNC: 32 G/DL (ref 31.4–37.4)
MCV RBC AUTO: 86 FL (ref 82–98)
MICROALBUMIN UR-MCNC: <5 MG/L (ref 0–20)
MICROALBUMIN/CREAT 24H UR: <22 MG/G CREATININE (ref 0–30)
MONOCYTES # BLD AUTO: 0.44 THOUSAND/ΜL (ref 0.17–1.22)
MONOCYTES NFR BLD AUTO: 10 % (ref 4–12)
NEUTROPHILS # BLD AUTO: 2.24 THOUSANDS/ΜL (ref 1.85–7.62)
NEUTS SEG NFR BLD AUTO: 52 % (ref 43–75)
NITRITE UR QL STRIP: NEGATIVE
NON-SQ EPI CELLS URNS QL MICRO: NORMAL /HPF
NRBC BLD AUTO-RTO: 0 /100 WBCS
PH UR STRIP.AUTO: 6 [PH]
PHOSPHATE SERPL-MCNC: 1.9 MG/DL (ref 2.3–4.1)
PLATELET # BLD AUTO: 180 THOUSANDS/UL (ref 149–390)
PMV BLD AUTO: 10.5 FL (ref 8.9–12.7)
POTASSIUM SERPL-SCNC: 3.6 MMOL/L (ref 3.5–5.3)
PROT UR STRIP-MCNC: NEGATIVE MG/DL
PTH-INTACT SERPL-MCNC: 63.1 PG/ML (ref 18.4–80.1)
RBC # BLD AUTO: 5.44 MILLION/UL (ref 3.88–5.62)
RBC #/AREA URNS AUTO: NORMAL /HPF
SODIUM SERPL-SCNC: 140 MMOL/L (ref 136–145)
SP GR UR STRIP.AUTO: <=1.005 (ref 1–1.03)
URATE SERPL-MCNC: 6.1 MG/DL (ref 4.2–8)
UROBILINOGEN UR QL STRIP.AUTO: 0.2 E.U./DL
WBC # BLD AUTO: 4.35 THOUSAND/UL (ref 4.31–10.16)
WBC #/AREA URNS AUTO: NORMAL /HPF

## 2020-10-03 PROCEDURE — 82306 VITAMIN D 25 HYDROXY: CPT

## 2020-10-03 PROCEDURE — 83970 ASSAY OF PARATHORMONE: CPT

## 2020-10-03 PROCEDURE — 81001 URINALYSIS AUTO W/SCOPE: CPT

## 2020-10-03 PROCEDURE — 84100 ASSAY OF PHOSPHORUS: CPT

## 2020-10-03 PROCEDURE — 36415 COLL VENOUS BLD VENIPUNCTURE: CPT

## 2020-10-03 PROCEDURE — 84550 ASSAY OF BLOOD/URIC ACID: CPT

## 2020-10-03 PROCEDURE — 85025 COMPLETE CBC W/AUTO DIFF WBC: CPT

## 2020-10-03 PROCEDURE — 82043 UR ALBUMIN QUANTITATIVE: CPT

## 2020-10-03 PROCEDURE — 80048 BASIC METABOLIC PNL TOTAL CA: CPT

## 2020-10-03 PROCEDURE — 82570 ASSAY OF URINE CREATININE: CPT

## 2020-10-05 ENCOUNTER — TELEPHONE (OUTPATIENT)
Dept: UROLOGY | Facility: CLINIC | Age: 60
End: 2020-10-05

## 2020-10-05 ENCOUNTER — OFFICE VISIT (OUTPATIENT)
Dept: NEPHROLOGY | Facility: CLINIC | Age: 60
End: 2020-10-05
Payer: COMMERCIAL

## 2020-10-05 VITALS
HEIGHT: 72 IN | TEMPERATURE: 97.2 F | SYSTOLIC BLOOD PRESSURE: 132 MMHG | WEIGHT: 225 LBS | HEART RATE: 59 BPM | RESPIRATION RATE: 16 BRPM | BODY MASS INDEX: 30.48 KG/M2 | DIASTOLIC BLOOD PRESSURE: 92 MMHG

## 2020-10-05 DIAGNOSIS — N18.30 STAGE 3 CHRONIC KIDNEY DISEASE, UNSPECIFIED WHETHER STAGE 3A OR 3B CKD (HCC): Primary | ICD-10-CM

## 2020-10-05 DIAGNOSIS — E55.9 VITAMIN D DEFICIENCY: ICD-10-CM

## 2020-10-05 DIAGNOSIS — N18.30 HYPERTENSIVE KIDNEY DISEASE WITH STAGE 3 CHRONIC KIDNEY DISEASE, UNSPECIFIED WHETHER STAGE 3A OR 3B CKD (HCC): ICD-10-CM

## 2020-10-05 DIAGNOSIS — I12.9 HYPERTENSIVE KIDNEY DISEASE WITH STAGE 3 CHRONIC KIDNEY DISEASE, UNSPECIFIED WHETHER STAGE 3A OR 3B CKD (HCC): ICD-10-CM

## 2020-10-05 PROCEDURE — 99214 OFFICE O/P EST MOD 30 MIN: CPT | Performed by: INTERNAL MEDICINE

## 2021-04-13 ENCOUNTER — TELEPHONE (OUTPATIENT)
Dept: UROLOGY | Facility: CLINIC | Age: 61
End: 2021-04-13

## 2021-04-13 DIAGNOSIS — R97.20 ELEVATED PSA: Primary | ICD-10-CM

## 2021-04-13 RX ORDER — CIPROFLOXACIN 500 MG/1
500 TABLET, FILM COATED ORAL EVERY 12 HOURS SCHEDULED
Qty: 6 TABLET | Refills: 0 | Status: SHIPPED | OUTPATIENT
Start: 2021-04-13 | End: 2021-04-16

## 2021-04-13 NOTE — TELEPHONE ENCOUNTER
The patient appears to have canceled his urology  Procedure visit today  He was scheduled for cystoscopy and transrectal ultrasound to evaluate his lower urinary tract symptoms  please note I reviewed his outside labs and his PSA is significantly elevated at 7 a prior value of above 4  I recommended a prostate biopsy performed at the same time as his cysto  Please contact the patient and reschedule him for a prostate biopsy along with cystoscopy and transrectal ultrasound at his earliest convenience    Cipro has been prescribed to his  Pharmacy and he will need biopsy instructions including the enema prep

## 2021-04-15 NOTE — TELEPHONE ENCOUNTER
Patient last seen 2/4/2020  Recommended to proceed with cysto TRUS at that time  This was delayed due to covid  His psa was elevated thereafter and recommendation change to have prostate biopsy done then as well  His appts have been cancelled several times now  Scheduled 5/13 at the [address] [place] [address] as this is the first available  Called and spoke with patient  Reviewed recent psa level and the need for follow up with cystoscopy and prostate biopsy  Patient is agreeable  Reviewed the first available is 5/13 at the [facility] office  Patient refused this appt because he will be out of town  Stressed the importance of FU as this has been delayed several months now  Scheduled biopsy 6/9 at the [address] [place] [address] along with antibx injection and results 6/30 again at the [address] [place] [address] due to access and the providers schedules  Patient will have access to all these appts in his my chart account  Will also send prostate biopsy prep instructions through to his my chart account  He reports he does have an appt with [place] urologist next week as he has been unhappy with his care here thus far  If he chooses to FU with a different urologist he will call to cancel but encouraged him to keep these appts on the books as they have been delayed now for some time

## 2021-04-29 ENCOUNTER — TELEPHONE (OUTPATIENT)
Dept: NEPHROLOGY | Facility: CLINIC | Age: 61
End: 2021-04-29

## 2021-05-04 ENCOUNTER — TELEPHONE (OUTPATIENT)
Dept: NEPHROLOGY | Facility: CLINIC | Age: 61
End: 2021-05-04

## 2021-05-04 NOTE — TELEPHONE ENCOUNTER
I called and left a message on machine for patient to return our call about confirming appointment   Marianela Germain,

## 2021-05-05 ENCOUNTER — TELEPHONE (OUTPATIENT)
Dept: NEPHROLOGY | Facility: CLINIC | Age: 61
End: 2021-05-05

## 2021-05-05 NOTE — TELEPHONE ENCOUNTER
Pt called and LM stating that would like to reschedule his 05/05/21 appt  Called and LM that appt was rescheduled for 08/09/21 with Dr Keenan Barrier  Asked to call us back at [phone] if any questions

## 2021-05-24 NOTE — TELEPHONE ENCOUNTER
Pt called to cancel scheduled appointments 6/2 stating he had prostate biopsy 5/7 at the [facility] he had 5/21 results appointment which he states were positive and different treatment options discussed they scheduled bone density 6/7 at [facility] and results appointment 6/25,any additional questions contact the pt directly

## 2021-07-12 PROBLEM — C61 PROSTATE CANCER (HCC): Status: ACTIVE | Noted: 2021-07-12

## 2021-07-13 ENCOUNTER — APPOINTMENT (OUTPATIENT)
Dept: RADIATION ONCOLOGY | Facility: CLINIC | Age: 61
End: 2021-07-13
Attending: RADIOLOGY
Payer: COMMERCIAL

## 2021-07-13 VITALS
SYSTOLIC BLOOD PRESSURE: 120 MMHG | DIASTOLIC BLOOD PRESSURE: 80 MMHG | BODY MASS INDEX: 29.84 KG/M2 | RESPIRATION RATE: 14 BRPM | HEART RATE: 78 BPM | TEMPERATURE: 98.2 F | WEIGHT: 220 LBS

## 2021-07-13 DIAGNOSIS — C61 PROSTATE CANCER (HCC): Primary | ICD-10-CM

## 2021-07-13 PROCEDURE — 99211 OFF/OP EST MAY X REQ PHY/QHP: CPT | Performed by: RADIOLOGY

## 2021-07-13 PROCEDURE — 99204 OFFICE O/P NEW MOD 45 MIN: CPT | Performed by: RADIOLOGY

## 2021-07-13 NOTE — PROGRESS NOTES
Consultation - Radiation Oncology      GFB:[id number] : 1960  Encounter: [id number]  Patient Information: Saulo Gallegos      CHIEF COMPLAINT  Chief Complaint   Patient presents with    Prostate Cancer     Cancer Staging  No matching staging information was found for the patient  History of Present Illness  Saulo Gallegos 1960 is a 63 y o  male 19 PSA 4 73     10/16/20 PSA 5 29     21 PSA 7 34     21   A Prostate, right lateral base: no prostate glandular tissue is identified  B  Prostate, right medial base: no prostate glandular tissue is identified  C  Prostate, right lateral mid: no prostate glandular tissue is identified  D  Prostate, right medial mid: no prostate glandular tissue is identified  E   Prostate, right lateral apex: Benign prostate tissue  F   Prostate, right medial apex:  small focus of atypical small acinar proliferation  G  Prostate, left lateral base: Adenocarcinoma, Esvin score 7 (3+4) Grade group 2  Discontinuously occupies 40% of the core  H   Prostate left medial base: minute focus of adenocarcinoma, [place] score 6 (3+3) Grade group 1  Occupies less than 5% of the core  I   Prostate Left lateral mid: Benign prostate tissue  J   Prostate, left medial mid: Small focus of atypical small acinar proliferation  K   Prostate, left lateral apex: Small focus of atypical small acinar proliferation  L    Prostate, left medial apex: benign prostate tissue     21 CT chest, abdomen and pelvis  No evidence of pulmonary metastatic disease  Limited evaluation of the abdominal viscera for metastasis without contrast material  3 mm bladder calculus  Moderate to marked steatosis  Possible punctate gallstone     21 Bone scan  Degenerative changes left shoulder, right sternoclavicular articulation, knees, feet and hands without evidence for metastatic disease     21 Manny Andrea  Discussed pathology and recommendations for treatment: radiation or prostatectomy  [facility]  States he is moving to [place] at the end of July and radiation treatment will be there             Oncology History Overview Note                 Historical Information   Oncology History   Prostate cancer ([facility] [address] )   5/7/2021 Biopsy    A Prostate, right lateral base: no prostate glandular tissue is identified  B  Prostate, right medial base: no prostate glandular tissue is identified  C  Prostate, right lateral mid: no prostate glandular tissue is identified  D  Prostate, right medial mid: no prostate glandular tissue is identified  E   Prostate, right lateral apex: Benign prostate tissue  F   Prostate, right medial apex:  small focus of atypical small acinar proliferation  G  Prostate, left lateral base: Adenocarcinoma, [place] score 7 (3+4) Grade group 2  Discontinuously occupies 40% of the core  H   Prostate left medial base: minute focus of adenocarcinoma, [place] score 6 (3+3) Grade group 1  Occupies less than 5% of the core  I   Prostate Left lateral mid: Benign prostate tissue  J   Prostate, left medial mid: Small focus of atypical small acinar proliferation  K   Prostate, left lateral apex: Small focus of atypical small acinar proliferation  L    Prostate, left medial apex: benign prostate tisue     7/12/2021 Initial Diagnosis    Prostate cancer [facility])           Past Medical History:   Diagnosis Date    Chronic kidney disease     Diabetes ([facility] [address] )     Hypertension     Kidney disease      Past Surgical History:   Procedure Laterality Date    HERNIA REPAIR      ROTATOR CUFF REPAIR  2011       Family History   Problem Relation Age of Onset   [place] Cancer Mother     Diabetes Mother     Diabetes Father     Hypertension Father     Hypertension Sister     Diabetes Sister     No Known Problems Sister        Social History   Social History     Substance and Sexual Activity   Alcohol Use None     Social History     Substance and Sexual Activity Drug Use Not on file     Social History     Tobacco Use   Smoking Status Never Smoker   Smokeless Tobacco Never Used         Meds/Allergies     Current Outpatient Medications:     aspirin 81 MG tablet, Take 81 mg by mouth, Disp: , Rfl:     atorvastatin (LIPITOR) 40 mg tablet, , Disp: , Rfl: 0    BASAGLAR KWIKPEN 100 units/mL injection pen, PLEASE SEE ATTACHED FOR DETAILED DIRECTIONS, Disp: , Rfl: 11    BD PEN NEEDLE DESMOND U/F 32G X 4 MM MISC, Use as directed, Disp: , Rfl: 9    carvedilol (COREG) 6 25 mg tablet, Take 6 25 mg by mouth 2 (two) times a day with meals, Disp: , Rfl: 0    chlorthalidone 25 mg tablet, take 1 tablet by mouth once daily, Disp: , Rfl:     Cholecalciferol 50 MCG (2000 UT) TABS, Take 1 tablet (2,000 Units total) by mouth daily, Disp: , Rfl:     fluticasone (FLONASE) 50 mcg/act nasal spray, SPRAY 2 SPRAYS INTO EACH NOSTRIL EVERY DAY, Disp: , Rfl: 11    glucose blood test strip, TEST twice a day TO 3 TIMES A DAY, Disp: , Rfl:     hydrocortisone (WESTCORT) 0 2 % cream, APPLY TOPICALLY TO AFFECTED AREA 2 TIMES A DAY, Disp: , Rfl: 0    insulin glargine (LANTUS SOLOSTAR) 100 units/mL injection pen, Inject 10 Units under the skin, Disp: , Rfl:     losartan (COZAAR) 100 MG tablet, , Disp: , Rfl: 0    ONE TOUCH ULTRA TEST test strip, TEST TWO TO THREE TIMES A DAY, Disp: , Rfl: 0    sildenafil (VIAGRA) 100 mg tablet, take 1 tablet by mouth 1 TO 4 HOURS BEFORE INTERCOURSE, NO MORE THAN 1 DOSE IN 24 HOURS, Disp: , Rfl:     tadalafil (CIALIS) 20 MG tablet, Take 1 tablet (20 mg total) by mouth daily as needed for erectile dysfunction (Patient not taking: Reported on 10/5/2020), Disp: 10 tablet, Rfl: 6    tamsulosin (FLOMAX) 0 4 mg, Take 0 4 mg by mouth daily with dinner, Disp: , Rfl:     Current Facility-Administered Medications:     cefTRIAXone (ROCEPHIN) injection 1,000 mg, 1,000 mg, Intramuscular, Once, Abdulaziz Poole MD  Allergies   Allergen Reactions    Tuberculin Purified Protein Derivative Other (See Comments)     Hx + ppd         Review of Systems   Constitutional: Negative for activity change, appetite change and fever  HENT: Negative for congestion, sneezing and sore throat  Eyes: Negative  Respiratory: Negative for cough and shortness of breath  Cardiovascular: Negative for chest pain, palpitations and leg swelling  Gastrointestinal: Negative for abdominal pain, anal bleeding, blood in stool and rectal pain  Endocrine: Negative  Genitourinary: Negative for difficulty urinating, flank pain, frequency, hematuria and urgency  Musculoskeletal: Negative for arthralgias, back pain, joint swelling and neck pain  Skin: Negative  Allergic/Immunologic: Negative  Neurological: Negative for dizziness, weakness, numbness and headaches  Hematological: Negative  Psychiatric/Behavioral: Negative  OBJECTIVE:   /80   Pulse 78   Temp 98 2 °F (36 8 °C)   Resp 14   Wt 99 8 kg (220 lb)   BMI 29 84 kg/m²   Pain Assessment:  0  Performance Status: ECOG/Zubrod/WHO: 0 - Asymptomatic    Physical Exam  Constitutional:       Appearance: He is normal weight  HENT:      Nose: No congestion  Mouth/Throat:      Pharynx: Oropharynx is clear  Eyes:      Extraocular Movements: Extraocular movements intact  Pupils: Pupils are equal, round, and reactive to light  Cardiovascular:      Rate and Rhythm: Normal rate and regular rhythm  Heart sounds: Normal heart sounds  Pulmonary:      Effort: Pulmonary effort is normal       Breath sounds: Normal breath sounds  Abdominal:      Palpations: Abdomen is soft  There is no mass  Genitourinary:     Comments: Rectal examination reveals nonenlarged prostate gland smooth without nodules  Musculoskeletal:         General: No swelling or tenderness  Normal range of motion  Cervical back: Normal range of motion and neck supple  Skin:     General: Skin is dry  Findings: No lesion or rash  Neurological:      General: No focal deficit present  Mental Status: He is alert and oriented to person, place, and time  Mental status is at baseline  Psychiatric:         Mood and Affect: Mood normal          Behavior: Behavior normal             RESULTS  Lab Results    Chemistry        Component Value Date/Time    K 3 6 10/03/2020 1009     10/03/2020 1009    CO2 28 10/03/2020 1009    BUN 21 10/03/2020 1009    CREATININE 1 84 (H) 10/03/2020 1009        Component Value Date/Time    CALCIUM 9 5 10/03/2020 1009            Lab Results   Component Value Date    WBC 4 35 10/03/2020    HGB 15 0 10/03/2020    HCT 46 9 10/03/2020    MCV 86 10/03/2020     10/03/2020         Imaging Studies  No results found  Pathology:   [place] score 7 3+4 adenocarcinoma prostate  ASSESSMENT  1  Prostate cancer [facility])       Cancer Staging  No matching staging information was found for the patient  PLAN/DISCUSSION  No orders of the defined types were placed in this encounter  Vik Ozuna is a 63y o  year old male with   Stage II [place] 7 3+4 adenocarcinoma prostate  We discuss different radiotherapeutic modalities in external radiation irradiation by IMRT, SBRT using cyberknife, proton beam therapy and permanent low dose rate isotopes or afterloading high dose rate prostate brachytherapy  We reviewed side effects and long-term complications  Also its effect on sexual potency  We did mention that at his age and low risk prostate malignancy moderate medical illness he is likewise suitable candidate for surgery robotic assisted radical prostatectomy  We have mentioned in particular that after surgery he can still receive radiation therapy but not conversely after radiation therapy surgery is not an option  He will decide when he is moved to [place]  He is only here to get educated on the treatment option of radiation therapy                             IMRT 28 treatments total dose 70 2 Gy, SBRT 5 7 treatments by gamma knife, proton beam and permanent radioisotope implant  Adrienne Olivo MD  4/05/4251,04:08 AM      Portions of the record may have been created with voice recognition software  Occasional wrong word or "sound a like" substitutions may have occurred due to the inherent limitations of voice recognition software  Read the chart carefully and recognize, using context, where substitutions have occurred

## 2021-07-13 NOTE — PROGRESS NOTES
Fang Flatten 1960 is a 63 y o  male 11/8/19 PSA 4 73    10/16/20 PSA 5 29    2/19/21 PSA 7 34    5/7/21   A Prostate, right lateral base: no prostate glandular tissue is identified  B  Prostate, right medial base: no prostate glandular tissue is identified  C  Prostate, right lateral mid: no prostate glandular tissue is identified  D  Prostate, right medial mid: no prostate glandular tissue is identified  E   Prostate, right lateral apex: Benign prostate tissue  F   Prostate, right medial apex:  small focus of atypical small acinar proliferation  G  Prostate, left lateral base: Adenocarcinoma, [place]vin score 7 (3+4) Grade group 2  Discontinuously occupies 40% of the core  H   Prostate left medial base: minute focus of adenocarcinoma, [place] score 6 (3+3) Grade group 1  Occupies less than 5% of the core  I   Prostate Left lateral mid: Benign prostate tissue  J   Prostate, left medial mid: Small focus of atypical small acinar proliferation  K   Prostate, left lateral apex: Small focus of atypical small acinar proliferation  L    Prostate, left medial apex: benign prostate tissue    6/7/21 CT chest, abdomen and pelvis  No evidence of pulmonary metastatic disease  Limited evaluation of the abdominal viscera for metastasis without contrast material  3 mm bladder calculus  Moderate to marked steatosis  Possible punctate gallstone    6/7/21 Bone scan  Degenerative changes left shoulder, right sternoclavicular articulation, knees, feet and hands without evidence for metastatic disease    6/25/21 Manny Luna  Discussed pathology and recommendations for treatment: radiation or prostatectomy  [facility]  States he is moving to [place] [id number] at the end of July and radiation treatment will be there      Oncology History Overview Note   11/8/19 PSA 4 73    10/16/20 PSA 5 29    2/19/21 PSA 7 34    5/7/21   A Prostate, right lateral base: no prostate glandular tissue is identified  B  Prostate, right medial base: no prostate glandular tissue is identified  C  Prostate, right lateral mid: no prostate glandular tissue is identified  D  Prostate, right medial mid: no prostate glandular tissue is identified  E   Prostate, right lateral apex: Benign prostate tissue  F   Prostate, right medial apex:  small focus of atypical small acinar proliferation  G  Prostate, left lateral base: Adenocarcinoma, [place] score 7 (3+4) Grade group 2  Discontinuously occupies 40% of the core  H   Prostate left medial base: minute focus of adenocarcinoma, [place] score 6 (3+3) Grade group 1  Occupies less than 5% of the core  I   Prostate Left lateral mid: Benign prostate tissue  J   Prostate, left medial mid: Small focus of atypical small acinar proliferation  K   Prostate, left lateral apex: Small focus of atypical small acinar proliferation  L  Prostate, left medial apex: benign prostate tissue    6/7/21 CT chest, abdomen and pelvis  No evidence of pulmonary metastatic disease  Limited evaluation of the abdominal viscera for metastasis without contrast material  3 mm bladder calculus  Moderate to marked steatosis  Possible punctate gallstone    6/7/21 Bone scan  Degenerative changes left shoulder, right sternoclavicular articulation, knees, feet and hands without evidence for metastatic disease    6/25/21 Manny Almanza  Discussed pathology and recommendations for treatment: radiation or prostatectomy  [facility] radiation therapy order placed  States he is moving to [facility] at the end of July and radiation treatment will be there     Prostate cancer ([facility] [address] )   5/7/2021 Biopsy    A Prostate, right lateral base: no prostate glandular tissue is identified  B  Prostate, right medial base: no prostate glandular tissue is identified  C  Prostate, right lateral mid: no prostate glandular tissue is identified  D    Prostate, right medial mid: no prostate glandular tissue is identified  E  Prostate, right lateral apex: Benign prostate tissue  F   Prostate, right medial apex:  small focus of atypical small acinar proliferation  G  Prostate, left lateral base: Adenocarcinoma, [place] score 7 (3+4) Grade group 2  Discontinuously occupies 40% of the core  H   Prostate left medial base: minute focus of adenocarcinoma, [place] score 6 (3+3) Grade group 1  Occupies less than 5% of the core  I   Prostate Left lateral mid: Benign prostate tissue  J   Prostate, left medial mid: Small focus of atypical small acinar proliferation  K   Prostate, left lateral apex: Small focus of atypical small acinar proliferation  L    Prostate, left medial apex: benign prostate tisue     7/12/2021 Initial Diagnosis    [facility]         Clinical Trial: no      Health Maintenance   Topic Date Due    Hepatitis C Screening  Never done    Diabetic Foot Exam  Never done    DM Eye Exam  Never done    Depression Screening PHQ  Never done    HIV Screening  Never done    BMI: Followup Plan  Never done    Annual Physical  Never done    Colorectal Cancer Screening  Never done    Influenza Vaccine (1) 09/01/2021    HEMOGLOBIN A1C  09/29/2021    BMI: Adult  10/05/2021    Pneumococcal Vaccine: Pediatrics (0 to 5 Years) and At-Risk Patients (6 to 59 Years) (2 of 2 - PPSV23) 12/19/2024    DTaP,Tdap,and Td Vaccines (6 - Td or Tdap) 06/15/2031    COVID-19 Vaccine  Completed    HIB Vaccine  Aged Out    Hepatitis B Vaccine  Aged Out    IPV Vaccine  Aged Out    Hepatitis A Vaccine  Aged Out    Meningococcal ACWY Vaccine  Aged Out    HPV Vaccine  Aged Out       Past Medical History:   Diagnosis Date    Chronic kidney disease     Diabetes [facility] [address] [facility]     Hypertension     Kidney disease        Past Surgical History:   Procedure Laterality Date    HERNIA REPAIR      ROTATOR CUFF REPAIR  2011       Family History   Problem Relation Age of Onset   [place] García Cancer Mother     Diabetes Mother     Diabetes Father     Hypertension Father     Hypertension Sister     Diabetes Sister     No Known Problems Sister        Social History     Tobacco Use    Smoking status: Never Smoker    Smokeless tobacco: Never Used   Substance Use Topics    Alcohol use: Not on file    Drug use: Not on file          Current Outpatient Medications:     aspirin 81 MG tablet, Take 81 mg by mouth, Disp: , Rfl:     atorvastatin (LIPITOR) 40 mg tablet, , Disp: , Rfl: 0    BASAGLAR KWIKPEN 100 units/mL injection pen, PLEASE SEE ATTACHED FOR DETAILED DIRECTIONS, Disp: , Rfl: 11    BD PEN NEEDLE DESMOND U/F 32G X 4 MM MISC, Use as directed, Disp: , Rfl: 9    carvedilol (COREG) 6 25 mg tablet, Take 6 25 mg by mouth 2 (two) times a day with meals, Disp: , Rfl: 0    chlorthalidone 25 mg tablet, take 1 tablet by mouth once daily, Disp: , Rfl:     Cholecalciferol 50 MCG (2000 UT) TABS, Take 1 tablet (2,000 Units total) by mouth daily, Disp: , Rfl:     fluticasone (FLONASE) 50 mcg/act nasal spray, SPRAY 2 SPRAYS INTO EACH NOSTRIL EVERY DAY, Disp: , Rfl: 11    glucose blood test strip, TEST twice a day TO 3 TIMES A DAY, Disp: , Rfl:     hydrocortisone (WESTCORT) 0 2 % cream, APPLY TOPICALLY TO AFFECTED AREA 2 TIMES A DAY, Disp: , Rfl: 0    insulin glargine (LANTUS SOLOSTAR) 100 units/mL injection pen, Inject 10 Units under the skin, Disp: , Rfl:     losartan (COZAAR) 100 MG tablet, , Disp: , Rfl: 0    ONE TOUCH ULTRA TEST test strip, TEST TWO TO THREE TIMES A DAY, Disp: , Rfl: 0    sildenafil (VIAGRA) 100 mg tablet, take 1 tablet by mouth 1 TO 4 HOURS BEFORE INTERCOURSE, NO MORE THAN 1 DOSE IN 24 HOURS, Disp: , Rfl:     tadalafil (CIALIS) 20 MG tablet, Take 1 tablet (20 mg total) by mouth daily as needed for erectile dysfunction (Patient not taking: Reported on 10/5/2020), Disp: 10 tablet, Rfl: 6    tamsulosin (FLOMAX) 0 4 mg, Take 0 4 mg by mouth daily with dinner, Disp: , Rfl:     Current Facility-Administered Medications:     cefTRIAXone (ROCEPHIN) injection 1,000 mg, 1,000 mg, Intramuscular, Once, Dario Soriano MD    Allergies   Allergen Reactions    Tuberculin Purified Protein Derivative Other (See Comments)     Hx + ppd        Review of Systems:  Review of Systems   Constitutional: Negative  HENT: Negative  Eyes: Negative  Respiratory: Negative  Has lung nodules  Follows with [address]  Cardiovascular: Negative  Gastrointestinal: Positive for diarrhea  Endocrine: Negative  Genitourinary: Positive for frequency and urgency  Nocturia x1  Taking Flomax  Musculoskeletal: Negative  Skin: Negative  Allergic/Immunologic: Negative  Neurological: Positive for light-headedness  Hematological: Negative  Psychiatric/Behavioral: Negative  Vitals:    07/13/21 1054   BP: 120/80   Pulse: 78   Resp: 14   Temp: 98 2 °F (36 8 °C)   Weight: 99 8 kg (220 lb)       Pain Score: 0-No pain    IPSS Questionnaire (AUA-7): Over the past month    1)  How often have you had a sensation of not emptying your bladder completely after you finish urinating? 0 - Not at all   2)  How often have you had to urinate again less than two hours after you finished urinating? 0 - Not at all   3)  How often have you found you stopped and started again several times when you urinated? 0 - Not at all   4) How difficult have you found it to postpone urination? 0 - Not at all   5) How often have you had a weak urinary stream?  0 - Not at all   6) How often have you had to push or strain to begin urination? 0 - Not at all   7) How many times did you most typically get up to urinate from the time you went to bed until the time you got up in the morning? 1 - 1 time   Total Score:  1       Pain assessment: 0    PFT    Imaging:No images are attached to the encounter       Teachingsim, side effects    MST    Implantable Devices (Port, pacemaker, pain stimulator)    Hip Replacement

## 2021-08-18 ENCOUNTER — TELEPHONE (OUTPATIENT)
Dept: NEPHROLOGY | Facility: CLINIC | Age: 61
End: 2021-08-18

## 2021-08-18 NOTE — TELEPHONE ENCOUNTER
I called and spoke to the patient about his upcoming appointment for 8/31/2021 nephrology follow up, patient stated that he wanted to cancel his appointment because he moved out of state to [facility],